# Patient Record
Sex: FEMALE
[De-identification: names, ages, dates, MRNs, and addresses within clinical notes are randomized per-mention and may not be internally consistent; named-entity substitution may affect disease eponyms.]

---

## 2017-11-01 PROBLEM — S62.316A CLOSED DISPLACED FRACTURE OF BASE OF FIFTH METACARPAL BONE OF RIGHT HAND, INITIAL ENCOUNTER: Status: ACTIVE | Noted: 2017-11-01

## 2017-11-03 ENCOUNTER — CHARTING TRANS (OUTPATIENT)
Dept: OTHER | Age: 72
End: 2017-11-03

## 2019-08-20 PROBLEM — M89.9 DISORDER OF BONE, UNSPECIFIED: Status: ACTIVE | Noted: 2019-08-20

## 2019-08-20 PROBLEM — Z12.31 ENCOUNTER FOR SCREENING MAMMOGRAM FOR BREAST CANCER: Status: ACTIVE | Noted: 2019-08-20

## 2019-08-20 PROBLEM — Z51.81 ENCOUNTER FOR MEDICATION MONITORING: Status: ACTIVE | Noted: 2019-08-20

## 2019-08-20 PROBLEM — R63.5 WEIGHT GAIN: Status: ACTIVE | Noted: 2019-08-20

## 2019-08-20 PROBLEM — Z87.891 PERSONAL HISTORY OF NICOTINE DEPENDENCE: Status: ACTIVE | Noted: 2019-08-20

## 2019-10-08 PROBLEM — F17.200 SMOKER: Status: ACTIVE | Noted: 2019-10-08

## 2019-10-08 PROBLEM — I73.9 CLAUDICATION (HCC): Status: ACTIVE | Noted: 2019-10-08

## 2020-12-14 PROBLEM — I73.9 CLAUDICATION (HCC): Status: RESOLVED | Noted: 2019-10-08 | Resolved: 2020-12-14

## 2021-04-30 PROBLEM — Z51.81 ENCOUNTER FOR MEDICATION MONITORING: Status: RESOLVED | Noted: 2019-08-20 | Resolved: 2021-04-30

## 2021-04-30 PROBLEM — R63.5 WEIGHT GAIN: Status: RESOLVED | Noted: 2019-08-20 | Resolved: 2021-04-30

## 2021-04-30 PROBLEM — F17.200 CURRENT SMOKER: Status: ACTIVE | Noted: 2019-10-08

## 2021-11-24 ENCOUNTER — APPOINTMENT (OUTPATIENT)
Dept: GENERAL RADIOLOGY | Facility: HOSPITAL | Age: 76
End: 2021-11-24
Attending: EMERGENCY MEDICINE
Payer: MEDICARE

## 2021-11-24 ENCOUNTER — HOSPITAL ENCOUNTER (EMERGENCY)
Facility: HOSPITAL | Age: 76
Discharge: HOME | End: 2021-11-24
Attending: EMERGENCY MEDICINE
Payer: MEDICARE

## 2021-11-24 VITALS
RESPIRATION RATE: 18 BRPM | TEMPERATURE: 98 F | HEART RATE: 99 BPM | WEIGHT: 169 LBS | DIASTOLIC BLOOD PRESSURE: 76 MMHG | BODY MASS INDEX: 27.82 KG/M2 | HEIGHT: 65.5 IN | SYSTOLIC BLOOD PRESSURE: 133 MMHG | OXYGEN SATURATION: 96 %

## 2021-11-24 DIAGNOSIS — S82.102A CLOSED FRACTURE OF PROXIMAL END OF LEFT TIBIA, UNSPECIFIED FRACTURE MORPHOLOGY, INITIAL ENCOUNTER: Primary | ICD-10-CM

## 2021-11-24 DIAGNOSIS — S62.613A CLOSED DISPLACED FRACTURE OF PROXIMAL PHALANX OF LEFT MIDDLE FINGER, INITIAL ENCOUNTER: ICD-10-CM

## 2021-11-24 PROCEDURE — 26720 TREAT FINGER FRACTURE EACH: CPT

## 2021-11-24 PROCEDURE — 97166 OT EVAL MOD COMPLEX 45 MIN: CPT

## 2021-11-24 PROCEDURE — 97162 PT EVAL MOD COMPLEX 30 MIN: CPT

## 2021-11-24 PROCEDURE — 73502 X-RAY EXAM HIP UNI 2-3 VIEWS: CPT | Performed by: EMERGENCY MEDICINE

## 2021-11-24 PROCEDURE — 97530 THERAPEUTIC ACTIVITIES: CPT

## 2021-11-24 PROCEDURE — 73560 X-RAY EXAM OF KNEE 1 OR 2: CPT | Performed by: EMERGENCY MEDICINE

## 2021-11-24 PROCEDURE — 99285 EMERGENCY DEPT VISIT HI MDM: CPT

## 2021-11-24 PROCEDURE — 73140 X-RAY EXAM OF FINGER(S): CPT | Performed by: EMERGENCY MEDICINE

## 2021-11-24 RX ORDER — IBUPROFEN 600 MG/1
600 TABLET ORAL ONCE
Status: COMPLETED | OUTPATIENT
Start: 2021-11-24 | End: 2021-11-24

## 2021-11-24 RX ORDER — HYDROCODONE BITARTRATE AND ACETAMINOPHEN 5; 325 MG/1; MG/1
1 TABLET ORAL ONCE
Status: COMPLETED | OUTPATIENT
Start: 2021-11-24 | End: 2021-11-24

## 2021-11-24 NOTE — OCCUPATIONAL THERAPY NOTE
OCCUPATIONAL THERAPY EVALUATION - INPATIENT      Room Number: 39HA/39-HA  Evaluation Date: 11/24/2021  Type of Evaluation: Initial       Physician Order: IP Consult to Occupational Therapy  Reason for Therapy: ADL/IADL Dysfunction and Discharge Planning maximize patient function and increase patient participation, safety, and independence with basic ADL and everyday activities.        DISCHARGE RECOMMENDATIONS: RIGOBERTO            PLAN  OT Treatment Plan: Energy conservation/work simplification techniques;Derrick agreeable to all tasks presented.      OCCUPATIONAL THERAPY EXAMINATION     OBJECTIVE  Precautions: Knee immobilizer (KI on until hinge brace arrives-hinge brace locked in extens)       WEIGHT BEARING RESTRICTION           L Lower Extremity: Non-Weight Bear

## 2021-11-24 NOTE — ED PROVIDER NOTES
Patient Seen in: [facility] Emergency Department      History   Patient presents with:  Fall    Stated Complaint: FALL     Subjective:   HPI    81-year-old female presents to the emergency department after a fall.   Patient reports slip and fall we on file             Review of Systems    Positive for stated complaint: FALL   Other systems are as noted in HPI. Constitutional and vital signs reviewed. All other systems reviewed and negative except as noted above.     Physical Exam     ED Triage V oriented to person, place, and time.       Comments: Motor exam limited due to left hip and knee pain, upper extremities 5/5 throughout, bilateral lower extremities distal strength and sensation intact               ED Course     Labs Reviewed   RAPID SARS- verbalized understanding of and agreement with this plan.              Disposition and Plan     Clinical Impression:  Closed fracture of proximal end of left tibia, unspecified fracture morphology, initial encounter  (primary encounter diagnosis)  Closed di

## 2021-11-24 NOTE — ED INITIAL ASSESSMENT (HPI)
PATIENT AOX3 TO ED VIA PRIVATE VEHICLE PATIENT [facility] FALL X TODAY DENIES LOC +LEFT KNEE PAIN RADIATING UPWARDS.  PAIN 6/10 +SWELLING NOTED

## 2021-11-24 NOTE — ED QUICK NOTES
[facility] at bedside to take patient to [facility]. All paperwork accompanying patient out of ed .

## 2021-11-24 NOTE — CM/SW NOTE
Authorization obtained for admission to rehab. The pt will go to room #22 on the [facility].     Nurse to nurse report #[phone]    [facility] ETA: [phone]    PCS completed

## 2021-11-24 NOTE — PHYSICAL THERAPY NOTE
PHYSICAL THERAPY EVALUATION - INPATIENT     Room Number: 39HA/39-HA  Evaluation Date: 11/24/2021  Type of Evaluation: Initial   Physician Order: PT Eval and Treat    Presenting Problem: fall at home resulting in non-displaced prox intra-articular tibial f SBA and support of LLE to maintain balance. Tolerance in sitting limited by pain. Patient supine on transport cart at end of session with needs in reach. Patient functioning lower than baseline levels.  Due to deficits listed above, unsafe to return Past Surgical History  Past Surgical History:   Procedure Laterality Date   • CATARACT EXTRACTION Bilateral    • COLONOSCOPY  04/03/2014   • DEXA, AXIAL SITE (SPINE, HIPS, PELVIS)  08/17, 9/19, 9/9/2021   • EXCISIONAL BIOPSY LEFT Left 1999    benign on back to sitting on the side of the bed?: A Lot   How much help from another person does the patient currently need. ..    Help from Another: Moving to and from a bed to a chair (including a wheelchair)?: A Lot   Help from Another: Need to walk in hospital

## 2021-11-24 NOTE — CM/SW NOTE
The order for knee brace faxed to [facility] in [place]. Referrals for rehab sent through [facility] to 8 facilities.     Pt will need PT/OT eval based on her insurance

## 2022-04-07 PROBLEM — F17.210 CIGARETTE SMOKER: Status: ACTIVE | Noted: 2019-10-08

## 2022-04-07 PROBLEM — F17.200 CURRENT SMOKER: Status: RESOLVED | Noted: 2019-10-08 | Resolved: 2022-04-07

## 2023-08-13 ENCOUNTER — APPOINTMENT (OUTPATIENT)
Dept: MRI IMAGING | Facility: HOSPITAL | Age: 78
End: 2023-08-13
Attending: Other
Payer: MEDICARE

## 2023-08-13 ENCOUNTER — HOSPITAL ENCOUNTER (INPATIENT)
Facility: HOSPITAL | Age: 78
LOS: 4 days | Discharge: NURSING, REHAB OR LONG-TERM CARE | End: 2023-08-17
Attending: EMERGENCY MEDICINE | Admitting: INTERNAL MEDICINE
Payer: MEDICARE

## 2023-08-13 ENCOUNTER — APPOINTMENT (OUTPATIENT)
Dept: CT IMAGING | Facility: HOSPITAL | Age: 78
End: 2023-08-13
Attending: Other
Payer: MEDICARE

## 2023-08-13 ENCOUNTER — APPOINTMENT (OUTPATIENT)
Dept: CT IMAGING | Facility: HOSPITAL | Age: 78
End: 2023-08-13
Attending: EMERGENCY MEDICINE
Payer: MEDICARE

## 2023-08-13 DIAGNOSIS — I63.9 ACUTE CVA (CEREBROVASCULAR ACCIDENT) (HCC): Primary | ICD-10-CM

## 2023-08-13 DIAGNOSIS — R03.0 ELEVATED BP WITHOUT DIAGNOSIS OF HYPERTENSION: ICD-10-CM

## 2023-08-13 PROBLEM — I61.1 NONTRAUMATIC CORTICAL HEMORRHAGE OF RIGHT CEREBRAL HEMISPHERE (HCC): Status: ACTIVE | Noted: 2023-08-13

## 2023-08-13 LAB
ALBUMIN SERPL-MCNC: 4.1 G/DL (ref 3.4–5)
ALP LIVER SERPL-CCNC: 73 U/L
ALT SERPL-CCNC: 20 U/L
AMMONIA PLAS-MCNC: 13 UMOL/L (ref 11–32)
AMPHET UR QL SCN: NEGATIVE
ANION GAP SERPL CALC-SCNC: 8 MMOL/L (ref 0–18)
APTT PPP: 29.9 SECONDS (ref 23.3–35.6)
AST SERPL-CCNC: 17 U/L (ref 15–37)
ATRIAL RATE: 76 BPM
BASOPHILS # BLD AUTO: 0.04 X10(3) UL (ref 0–0.2)
BASOPHILS NFR BLD AUTO: 0.4 %
BENZODIAZ UR QL SCN: NEGATIVE
BILIRUB DIRECT SERPL-MCNC: 0.1 MG/DL (ref 0–0.2)
BILIRUB SERPL-MCNC: 0.4 MG/DL (ref 0.1–2)
BUN BLD-MCNC: 7 MG/DL (ref 7–18)
BUN/CREAT SERPL: 10.3 (ref 10–20)
CALCIUM BLD-MCNC: 8.8 MG/DL (ref 8.5–10.1)
CANNABINOIDS UR QL SCN: NEGATIVE
CHLORIDE SERPL-SCNC: 99 MMOL/L (ref 98–112)
CO2 SERPL-SCNC: 25 MMOL/L (ref 21–32)
COCAINE UR QL: NEGATIVE
CREAT BLD-MCNC: 0.68 MG/DL
CREAT UR-SCNC: 14.2 MG/DL
DEPRECATED RDW RBC AUTO: 41.8 FL (ref 35.1–46.3)
EGFRCR SERPLBLD CKD-EPI 2021: 90 ML/MIN/1.73M2 (ref 60–?)
EOSINOPHIL # BLD AUTO: 0.03 X10(3) UL (ref 0–0.7)
EOSINOPHIL NFR BLD AUTO: 0.3 %
ERYTHROCYTE [DISTWIDTH] IN BLOOD BY AUTOMATED COUNT: 12.8 % (ref 11–15)
GLUCOSE BLD-MCNC: 109 MG/DL (ref 70–99)
GLUCOSE BLDC GLUCOMTR-MCNC: 113 MG/DL (ref 70–99)
GLUCOSE BLDC GLUCOMTR-MCNC: 115 MG/DL (ref 70–99)
GLUCOSE BLDC GLUCOMTR-MCNC: 134 MG/DL (ref 70–99)
HCT VFR BLD AUTO: 40.2 %
HGB BLD-MCNC: 13.8 G/DL
IMM GRANULOCYTES # BLD AUTO: 0.05 X10(3) UL (ref 0–1)
IMM GRANULOCYTES NFR BLD: 0.5 %
INR BLD: 0.96 (ref 0.85–1.16)
LYMPHOCYTES # BLD AUTO: 1.1 X10(3) UL (ref 1–4)
LYMPHOCYTES NFR BLD AUTO: 11.2 %
MCH RBC QN AUTO: 30.4 PG (ref 26–34)
MCHC RBC AUTO-ENTMCNC: 34.3 G/DL (ref 31–37)
MCV RBC AUTO: 88.5 FL
MDMA UR QL SCN: NEGATIVE
MONOCYTES # BLD AUTO: 0.93 X10(3) UL (ref 0.1–1)
MONOCYTES NFR BLD AUTO: 9.5 %
NEUTROPHILS # BLD AUTO: 7.69 X10 (3) UL (ref 1.5–7.7)
NEUTROPHILS # BLD AUTO: 7.69 X10(3) UL (ref 1.5–7.7)
NEUTROPHILS NFR BLD AUTO: 78.1 %
OPIATES UR QL SCN: NEGATIVE
OSMOLALITY SERPL CALC.SUM OF ELEC: 273 MOSM/KG (ref 275–295)
OXYCODONE UR QL SCN: NEGATIVE
P AXIS: 67 DEGREES
P-R INTERVAL: 176 MS
PLATELET # BLD AUTO: 277 10(3)UL (ref 150–450)
POTASSIUM SERPL-SCNC: 4.1 MMOL/L (ref 3.5–5.1)
PROT SERPL-MCNC: 7.3 G/DL (ref 6.4–8.2)
PROTHROMBIN TIME: 12.7 SECONDS (ref 11.6–14.8)
Q-T INTERVAL: 386 MS
QRS DURATION: 78 MS
QTC CALCULATION (BEZET): 434 MS
R AXIS: 8 DEGREES
RBC # BLD AUTO: 4.54 X10(6)UL
SODIUM SERPL-SCNC: 132 MMOL/L (ref 136–145)
T AXIS: 31 DEGREES
VENTRICULAR RATE: 76 BPM
WBC # BLD AUTO: 9.8 X10(3) UL (ref 4–11)

## 2023-08-13 PROCEDURE — 70496 CT ANGIOGRAPHY HEAD: CPT | Performed by: OTHER

## 2023-08-13 PROCEDURE — 99223 1ST HOSP IP/OBS HIGH 75: CPT | Performed by: OTHER

## 2023-08-13 PROCEDURE — 70498 CT ANGIOGRAPHY NECK: CPT | Performed by: OTHER

## 2023-08-13 PROCEDURE — 70450 CT HEAD/BRAIN W/O DYE: CPT | Performed by: EMERGENCY MEDICINE

## 2023-08-13 PROCEDURE — 70551 MRI BRAIN STEM W/O DYE: CPT | Performed by: OTHER

## 2023-08-13 RX ORDER — LABETALOL HYDROCHLORIDE 5 MG/ML
10 INJECTION, SOLUTION INTRAVENOUS EVERY 10 MIN PRN
Status: DISCONTINUED | OUTPATIENT
Start: 2023-08-13 | End: 2023-08-17

## 2023-08-13 RX ORDER — LORAZEPAM 2 MG/ML
2 INJECTION INTRAMUSCULAR
Status: DISCONTINUED | OUTPATIENT
Start: 2023-08-13 | End: 2023-08-16

## 2023-08-13 RX ORDER — ACETAMINOPHEN 500 MG
500 TABLET ORAL EVERY 4 HOURS PRN
Status: DISCONTINUED | OUTPATIENT
Start: 2023-08-13 | End: 2023-08-17

## 2023-08-13 RX ORDER — ATORVASTATIN CALCIUM 40 MG/1
40 TABLET, FILM COATED ORAL DAILY
Status: DISCONTINUED | OUTPATIENT
Start: 2023-08-13 | End: 2023-08-17

## 2023-08-13 RX ORDER — LEVETIRACETAM 500 MG/5ML
500 INJECTION, SOLUTION, CONCENTRATE INTRAVENOUS EVERY 12 HOURS
Status: DISCONTINUED | OUTPATIENT
Start: 2023-08-13 | End: 2023-08-17

## 2023-08-13 RX ORDER — LEVETIRACETAM 500 MG/5ML
500 INJECTION, SOLUTION, CONCENTRATE INTRAVENOUS EVERY 12 HOURS
Status: DISCONTINUED | OUTPATIENT
Start: 2023-08-14 | End: 2023-08-13

## 2023-08-13 RX ORDER — ENEMA 19; 7 G/133ML; G/133ML
1 ENEMA RECTAL ONCE AS NEEDED
Status: DISCONTINUED | OUTPATIENT
Start: 2023-08-13 | End: 2023-08-17

## 2023-08-13 RX ORDER — ACETAMINOPHEN 650 MG/1
650 SUPPOSITORY RECTAL EVERY 4 HOURS PRN
Status: DISCONTINUED | OUTPATIENT
Start: 2023-08-13 | End: 2023-08-17

## 2023-08-13 RX ORDER — METOCLOPRAMIDE HYDROCHLORIDE 5 MG/ML
10 INJECTION INTRAMUSCULAR; INTRAVENOUS EVERY 8 HOURS PRN
Status: DISCONTINUED | OUTPATIENT
Start: 2023-08-13 | End: 2023-08-13

## 2023-08-13 RX ORDER — LORAZEPAM 1 MG/1
1 TABLET ORAL
Status: DISCONTINUED | OUTPATIENT
Start: 2023-08-13 | End: 2023-08-16

## 2023-08-13 RX ORDER — ACETAMINOPHEN 325 MG/1
650 TABLET ORAL EVERY 4 HOURS PRN
Status: DISCONTINUED | OUTPATIENT
Start: 2023-08-13 | End: 2023-08-13

## 2023-08-13 RX ORDER — BISACODYL 10 MG
10 SUPPOSITORY, RECTAL RECTAL
Status: DISCONTINUED | OUTPATIENT
Start: 2023-08-13 | End: 2023-08-17

## 2023-08-13 RX ORDER — SENNOSIDES 8.6 MG
17.2 TABLET ORAL NIGHTLY PRN
Status: DISCONTINUED | OUTPATIENT
Start: 2023-08-13 | End: 2023-08-17

## 2023-08-13 RX ORDER — LORAZEPAM 1 MG/1
2 TABLET ORAL
Status: DISCONTINUED | OUTPATIENT
Start: 2023-08-13 | End: 2023-08-16

## 2023-08-13 RX ORDER — ONDANSETRON 2 MG/ML
4 INJECTION INTRAMUSCULAR; INTRAVENOUS EVERY 6 HOURS PRN
Status: DISCONTINUED | OUTPATIENT
Start: 2023-08-13 | End: 2023-08-17

## 2023-08-13 RX ORDER — METOCLOPRAMIDE HYDROCHLORIDE 5 MG/ML
10 INJECTION INTRAMUSCULAR; INTRAVENOUS EVERY 8 HOURS PRN
Status: DISCONTINUED | OUTPATIENT
Start: 2023-08-13 | End: 2023-08-17

## 2023-08-13 RX ORDER — MINERAL OIL AND PETROLATUM 150; 830 MG/G; MG/G
OINTMENT OPHTHALMIC AS NEEDED
Status: DISCONTINUED | OUTPATIENT
Start: 2023-08-13 | End: 2023-08-17

## 2023-08-13 RX ORDER — MINERAL OIL, PETROLATUM 425; 568 MG/G; MG/G
OINTMENT OPHTHALMIC
Status: DISCONTINUED | OUTPATIENT
Start: 2023-08-13 | End: 2023-08-13 | Stop reason: SDUPTHER

## 2023-08-13 RX ORDER — POLYETHYLENE GLYCOL 3350 17 G/17G
17 POWDER, FOR SOLUTION ORAL DAILY PRN
Status: DISCONTINUED | OUTPATIENT
Start: 2023-08-13 | End: 2023-08-17

## 2023-08-13 RX ORDER — HYDRALAZINE HYDROCHLORIDE 20 MG/ML
10 INJECTION INTRAMUSCULAR; INTRAVENOUS EVERY 2 HOUR PRN
Status: DISCONTINUED | OUTPATIENT
Start: 2023-08-13 | End: 2023-08-17

## 2023-08-13 RX ORDER — SODIUM CHLORIDE 9 MG/ML
INJECTION, SOLUTION INTRAVENOUS CONTINUOUS
Status: DISCONTINUED | OUTPATIENT
Start: 2023-08-13 | End: 2023-08-16

## 2023-08-13 RX ORDER — LORAZEPAM 2 MG/ML
1 INJECTION INTRAMUSCULAR
Status: DISCONTINUED | OUTPATIENT
Start: 2023-08-13 | End: 2023-08-16

## 2023-08-13 RX ORDER — NICOTINE 21 MG/24HR
1 PATCH, TRANSDERMAL 24 HOURS TRANSDERMAL DAILY
Status: DISCONTINUED | OUTPATIENT
Start: 2023-08-13 | End: 2023-08-17

## 2023-08-13 RX ORDER — ONDANSETRON 2 MG/ML
4 INJECTION INTRAMUSCULAR; INTRAVENOUS EVERY 6 HOURS PRN
Status: DISCONTINUED | OUTPATIENT
Start: 2023-08-13 | End: 2023-08-13

## 2023-08-13 RX ORDER — LABETALOL HYDROCHLORIDE 5 MG/ML
20 INJECTION, SOLUTION INTRAVENOUS ONCE
Status: COMPLETED | OUTPATIENT
Start: 2023-08-13 | End: 2023-08-13

## 2023-08-13 RX ORDER — LEVETIRACETAM 500 MG/5ML
1000 INJECTION, SOLUTION, CONCENTRATE INTRAVENOUS ONCE
Status: COMPLETED | OUTPATIENT
Start: 2023-08-13 | End: 2023-08-13

## 2023-08-13 NOTE — PROGRESS NOTES
Received patient from ED to PCCU 216. Report received from [facility]. Medications, labs, plan of care reviewed. Skin assessment on arrival to unit performed with Grace Rodríguez and Andrew LAMAR. Wounds are as follows: none, skin intact. Additional notifications/statements include: daughters at bedside. Patient A&Ox4, restless, impulsive, left hand grasp slightly weaker. Moves all extremities. Nicardipine infusing to keep SBP <140.

## 2023-08-13 NOTE — ED QUICK NOTES
Orders for admission, patient is aware of plan and ready to go upstairs. Any questions, please call ED RN Helen at extension [phone].      Patient Covid vaccination status: Fully vaccinated     COVID Test Ordered in ED: None    COVID Suspicion at Admission: N/A    Running Infusions:    niCARdipine Stopped (08/13/23 1094)    None    Mental Status/LOC at time of transport: AO x3, drowsy    Other pertinent information:   CIWA score: N/A   NIH score:  7

## 2023-08-13 NOTE — ED QUICK NOTES
Patient off floor to CT via cart, accompanied by RN    Patient states she remembers feeling \"disoriented\", she is repeating questions and sometimes tries sitting up on her own.   AO x4

## 2023-08-13 NOTE — ED QUICK NOTES
Patient was getting ready for a lunch with her friends, at 57 today family noticed patient unable to see out of left eye (I.e. couldn't reach for objects, and favoring right eye, trying to rifle through her purse and making noise). Weak left , delayed lifting LLE    Took ibuprofen twice this am for chronic ortho pain, takes low dose ASA daily.

## 2023-08-13 NOTE — ED INITIAL ASSESSMENT (HPI)
Per family patient is more disoriented this am. She is unable to see items in front of her and acting very confused per daughters. Also states she is unable to see on the left side of her. She stated to become altered at 11am today. No weakness noted in any extremity. Patient with difficulty following commands.

## 2023-08-13 NOTE — PLAN OF CARE
Patient A&Ox4, impulsive/ figety, unable to sit still at times. Moves all extremities, PERRLA, left sided visual deficit. Slight weakness noted to left arm. NSR on monitor, Nicardipine titrated to keep SBP <140. Family at bedside and aware of plan of care and test results. Kept Npo for SLP eval. Dysphagia screening deferred  until more awake. Bed alarm in use, fall precautions maintained. Patient refusing nicotine patch at this time. See assessments. Will monitor neuro status Q1hr and assess for changes. Problem: CARDIOVASCULAR - ADULT  Goal: Maintains optimal cardiac output and hemodynamic stability  Description: INTERVENTIONS:  - Monitor vital signs, rhythm, and trends  - Monitor for bleeding, hypotension and signs of decreased cardiac output  - Evaluate effectiveness of vasoactive medications to optimize hemodynamic stability  - Monitor arterial and/or venous puncture sites for bleeding and/or hematoma  - Assess quality of pulses, skin color and temperature  - Assess for signs of decreased coronary artery perfusion - ex. Angina  - Evaluate fluid balance, assess for edema, trend weights  Outcome: Progressing     Problem: NEUROLOGICAL - ADULT  Goal: Achieves stable or improved neurological status  Description: INTERVENTIONS  - Assess for and report changes in neurological status  - Initiate measures to prevent increased intracranial pressure  - Maintain blood pressure and fluid volume within ordered parameters to optimize cerebral perfusion and minimize risk of hemorrhage  - Monitor temperature, glucose, and sodium.  Initiate appropriate interventions as ordered  Outcome: Progressing  Goal: Achieves maximal functionality and self care  Description: INTERVENTIONS  - Monitor swallowing and airway patency with patient fatigue and changes in neurological status  - Encourage and assist patient to increase activity and self care with guidance from PT/OT  - Encourage visually impaired, hearing impaired and aphasic patients to use assistive/communication devices  Outcome: Progressing     Problem: Impaired Swallowing  Goal: Minimize aspiration risk  Description: Interventions:  - Patient should be alert and upright for all feedings (90 degrees preferred)  - Offer food and liquids at a slow rate  - No straws  - Encourage small bites of food and small sips of liquid  - Offer pills one at a time, crush or deliver with applesauce as needed  - Discontinue feeding and notify MD (or speech pathologist) if coughing or persistent throat clearing or wet/gurgly vocal quality is noted  Outcome: Progressing     Problem: Impaired Cognition  Goal: Patient will exhibit improved attention, thought processing and/or memory  Description: Interventions:  - Encourage use of hearing aids  Outcome: Progressing     Problem: Impaired Communication  Goal: Patient will achieve maximal communication potential  Description: Interventions:  - Allow additional time for processing after asking questions or providing instructions  - Encourage use of hearing aids  Outcome: Progressing     Problem: SAFETY ADULT - FALL  Goal: Free from fall injury  Description: INTERVENTIONS:  - Assess pt frequently for physical needs  - Identify cognitive and physical deficits and behaviors that affect risk of falls. - [place] fall precautions as indicated by assessment.  - Educate pt/family on patient safety including physical limitations  - Instruct pt to call for assistance with activity based on assessment  - Modify environment to reduce risk of injury  - Provide assistive devices as appropriate  - Consider OT/PT consult to assist with strengthening/mobility  - Encourage toileting schedule  Outcome: Progressing     Problem: Patient Centered Care  Goal: Patient preferences are identified and integrated in the patient's plan of care  Description: Interventions:  - What would you like us to know as we care for you?  Patient lives at home with daughter  - Provide timely, complete, and accurate information to patient/family  - Incorporate patient and family knowledge, values, beliefs, and cultural backgrounds into the planning and delivery of care  - Encourage patient/family to participate in care and decision-making at the level they choose  - Honor patient and family perspectives and choices  Outcome: Progressing     Problem: Patient/Family Goals  Goal: Patient/Family Long Term Goal  Description: Patient's Long Term Goal: to return home at prior level of function    Interventions:  - stroke protocol  -monitor CT/ MRI  -PT/OT , SLP eval  -frequent neuro checks    - See additional Care Plan goals for specific interventions  Outcome: Progressing  Goal: Patient/Family Short Term Goal  Description: Patient's Short Term Goal: improved mental status  Interventions:   - frequent neuro checks  -monitor CT, MRI  -monitor labs    - See additional Care Plan goals for specific interventions  Outcome: Progressing

## 2023-08-13 NOTE — ED QUICK NOTES
Critical care transport arranged for tx to [facility] per Dr. Grisel Laurent request, eta 15 min.

## 2023-08-13 NOTE — IMAGING NOTE
Neurosurgery    Pt presents with L visual field cut and L paresis. More confused but bright and awake. CT reviewed. L temporal IPH with local mass effect. Likely amyloid. No herniation. No indication for emergent surgery  Admit CCU  Strict SBP <140  Neuro consult\  Repeat CT in AM or sooner if any change in mental status.

## 2023-08-14 ENCOUNTER — APPOINTMENT (OUTPATIENT)
Dept: CT IMAGING | Facility: HOSPITAL | Age: 78
End: 2023-08-14
Attending: Other
Payer: MEDICARE

## 2023-08-14 ENCOUNTER — APPOINTMENT (OUTPATIENT)
Dept: CV DIAGNOSTICS | Facility: HOSPITAL | Age: 78
End: 2023-08-14
Attending: INTERNAL MEDICINE
Payer: MEDICARE

## 2023-08-14 PROBLEM — H53.462 LEFT HOMONYMOUS HEMIANOPSIA: Status: ACTIVE | Noted: 2023-08-14

## 2023-08-14 PROBLEM — G81.94 LEFT HEMIPARESIS (HCC): Status: ACTIVE | Noted: 2023-08-14

## 2023-08-14 LAB
ANION GAP SERPL CALC-SCNC: 5 MMOL/L (ref 0–18)
BASOPHILS # BLD AUTO: 0.03 X10(3) UL (ref 0–0.2)
BASOPHILS NFR BLD AUTO: 0.3 %
BUN BLD-MCNC: 5 MG/DL (ref 7–18)
BUN/CREAT SERPL: 9.6 (ref 10–20)
CALCIUM BLD-MCNC: 8.3 MG/DL (ref 8.5–10.1)
CHLORIDE SERPL-SCNC: 104 MMOL/L (ref 98–112)
CO2 SERPL-SCNC: 27 MMOL/L (ref 21–32)
CREAT BLD-MCNC: 0.52 MG/DL
DEPRECATED RDW RBC AUTO: 43 FL (ref 35.1–46.3)
EGFRCR SERPLBLD CKD-EPI 2021: 96 ML/MIN/1.73M2 (ref 60–?)
EOSINOPHIL # BLD AUTO: 0.01 X10(3) UL (ref 0–0.7)
EOSINOPHIL NFR BLD AUTO: 0.1 %
ERYTHROCYTE [DISTWIDTH] IN BLOOD BY AUTOMATED COUNT: 13 % (ref 11–15)
GLUCOSE BLD-MCNC: 120 MG/DL (ref 70–99)
GLUCOSE BLDC GLUCOMTR-MCNC: 111 MG/DL (ref 70–99)
GLUCOSE BLDC GLUCOMTR-MCNC: 111 MG/DL (ref 70–99)
HCT VFR BLD AUTO: 39.2 %
HGB BLD-MCNC: 13.1 G/DL
IMM GRANULOCYTES # BLD AUTO: 0.05 X10(3) UL (ref 0–1)
IMM GRANULOCYTES NFR BLD: 0.5 %
LYMPHOCYTES # BLD AUTO: 0.84 X10(3) UL (ref 1–4)
LYMPHOCYTES NFR BLD AUTO: 7.8 %
MAGNESIUM SERPL-MCNC: 1.7 MG/DL (ref 1.6–2.6)
MCH RBC QN AUTO: 30.1 PG (ref 26–34)
MCHC RBC AUTO-ENTMCNC: 33.4 G/DL (ref 31–37)
MCV RBC AUTO: 90.1 FL
MONOCYTES # BLD AUTO: 1.17 X10(3) UL (ref 0.1–1)
MONOCYTES NFR BLD AUTO: 10.9 %
MRSA DNA SPEC QL NAA+PROBE: NEGATIVE
NEUTROPHILS # BLD AUTO: 8.62 X10 (3) UL (ref 1.5–7.7)
NEUTROPHILS # BLD AUTO: 8.62 X10(3) UL (ref 1.5–7.7)
NEUTROPHILS NFR BLD AUTO: 80.4 %
OSMOLALITY SERPL CALC.SUM OF ELEC: 280 MOSM/KG (ref 275–295)
PLATELET # BLD AUTO: 269 10(3)UL (ref 150–450)
POTASSIUM SERPL-SCNC: 3.6 MMOL/L (ref 3.5–5.1)
RBC # BLD AUTO: 4.35 X10(6)UL
SARS-COV-2 RNA RESP QL NAA+PROBE: NOT DETECTED
SODIUM SERPL-SCNC: 136 MMOL/L (ref 136–145)
WBC # BLD AUTO: 10.7 X10(3) UL (ref 4–11)

## 2023-08-14 PROCEDURE — 70450 CT HEAD/BRAIN W/O DYE: CPT | Performed by: OTHER

## 2023-08-14 PROCEDURE — 99233 SBSQ HOSP IP/OBS HIGH 50: CPT | Performed by: OTHER

## 2023-08-14 PROCEDURE — 93306 TTE W/DOPPLER COMPLETE: CPT | Performed by: INTERNAL MEDICINE

## 2023-08-14 RX ORDER — MAGNESIUM SULFATE HEPTAHYDRATE 40 MG/ML
2 INJECTION, SOLUTION INTRAVENOUS ONCE
Status: COMPLETED | OUTPATIENT
Start: 2023-08-14 | End: 2023-08-14

## 2023-08-14 NOTE — SLP NOTE
ADULT SWALLOWING EVALUATION    ASSESSMENT    ASSESSMENT/OVERALL IMPRESSION:  PPE REQUIRED. THIS THERAPIST WORE GLOVES, DROPLET MASK, AND GOGGLES FOR DURATION OF EVALUATION. HANDS WASHED UPON ENTRANCE/EXIT. Tony Jean SLP BSSE orders received and acknowledged. A swallow evaluation warranted secondary to stroke protocol. Pt afebrile with clear but weak vocal quality, on 2L/Min, with oxygen saturation at 96. Pt with no prior hx of dysphagia at [address] [place] [address]. Pt positioned upright in bed with daughters at bedside. Oral [facility] exam completed and overall reduced strength, range of motion, and rate of motion observed. Pt with adequate oral acceptance and bilabial seal across all trials. Pt with intact bite, prolonged mastication of solids, and increased OZZIE. Pt's swallow response appears delayed with reduced hyolaryngeal elevation/excursion. No clinical signs of aspiration (e.g., immediate/delayed throat clear, immediate/delayed cough, wet vocal quality, increased O2 effort) observed across all trials of puree and mildly thick liquids. Mild throat clearing observed with soft solids and thin liquids. Trials discontinued. Pt with fluctuating SINDHU throughout evaluation. Oral/buccal cavities clear of residue following all trials. Oxygen status remained >94 t/o the entire evaluation. At this time, pt presents with mild oral dysphagia and probable pharyngeal dysfunction. Recommend a PUREE diet and MILDLY THICK liquids with strict adherence to safe swallowing compensatory strategies. Results and recommendations reviewed with RN and family. SLP collaborated with RN for MD diet orders. FCM SCORE: 3/7     PLAN: SLP to f/u x4 meal assessments, monitor CXR, and VFSS if clinically warranted. RECOMMENDATIONS   Diet Recommendations - Solids: Puree  Diet Recommendations - Liquids: Nectar thick liquids/ Mildly thick      Compensatory Strategies Recommended: No straws; Slow rate; Alternate consistencies;Small bites and sips  Aspiration Precautions: Upright position; Slow rate;Small bites and sips; No straw  Medication Administration Recommendations: Crushed in puree  Treatment Plan/Recommendations: Aspiration precautions  Discharge Recommendations/Plan: Undetermined    HISTORY   MEDICAL HISTORY  Reason for Referral: Stroke protocol    Problem List  Principal Problem:    Acute CVA (cerebrovascular accident) [facility])  Active Problems:    Nontraumatic cortical hemorrhage of right cerebral hemisphere (HCC)    Elevated BP without diagnosis of hypertension    Left hemiparesis ([address]    Left homonymous hemianopsia      Past Medical History  Past Medical History:   Diagnosis Date    Agatston coronary artery calcium score greater than 400     Score = 424 in 9/2019    BRCA1 negative 2014    negative    BRCA2 negative 2005    negative    Chronic vasomotor rhinitis     Closed displaced fracture of base of fifth metacarpal bone of right hand, initial encounter     COPD (chronic obstructive pulmonary disease) ([facility]     Coronary artery disease involving native coronary artery of native heart without angina pectoris     Coronary atherosclerosis     Encounter for hepatitis C screening test for low risk patient 11/2018    negative    Hearing impairment     High cholesterol     History of colon polyps     History of nuclear stress test 08/2017    negative    Mixed hyperlipidemia     Osteoarthritis     Panlobular emphysema ([facility] [address]     By CT 9/2019    Personal history of nicotine dependence     Pulmonary emphysema (HCC)     Senile osteopenia     Sensorineural hearing loss (SNHL) of both ears     Sleep apnea     Visual impairment     Vitamin D deficiency        Prior Living Situation: Home with spouse;Home with support  Diet Prior to Admission: Regular; Thin liquids  Precautions: Aspiration;Hard of hearing    Patient/Family Goals: assess swallow for safest/least restrictive diet    SWALLOWING HISTORY  Current Diet Consistency: NPO  Dysphagia History: None at [address] [place] [address]     Imaging Results:     CT BRAIN 8/14/23:  CONCLUSION:   1. Large 7.8 cm intraparenchymal hematoma in the right temporal lobe overall measures similar in size as compared with previous day head CT. There is, however, a new or more conspicuous 2.4 cm focus of satellite hemorrhage along the posterior-superior   margin of the hematoma. This finding likely relates to expected evolution and/or redistribution of hemorrhage. Mild surrounding vasogenic edema with right cerebral hemispheric mass effect. There is 7 mm right to left midline shift as compared with 5   mm on prior. Continued follow-up is advised. 2. Lesser incidental findings as above.           elm-remote      Dictated by (CST): Janene Marroquin MD on 8/14/2023 at 7:10 AM       Finalized by (CST): Janene Marroquin MD on 8/14/2023 at 7:16 AM       MRI BRAIN 8/13/23:  CONCLUSION: Limited exam due to presence of patient motion. No evidence of acute or subacute infarct. 7 x 4 x 4 cm hematoma is again seen within the right temporal lobe, causing localized mass effect and stable 5 mm leftward midline shift. Dictated by (CST): Ramo Lindsey MD on 8/13/2023 at 6:00 PM       Finalized by (CST): Ramo Lindsey MD on 8/13/2023 at 6:03 PM     OBJECTIVE   ORAL MOTOR EXAMINATION  Dentition: Natural;Functional  Symmetry: Within Functional Limits  Strength:  (reduced)     Range of Motion:  (reduced)  Rate of Motion: Reduced    Voice Quality: Clear;Weak  Respiratory Status: Supplemental O2;Nasal cannula  Consistencies Trialed: Thin liquids; Nectar thick liquids;Puree; Soft solid  Method of Presentation: Staff/Clinician assistance;Spoon;Cup;Single sips  Patient Positioning: Upright;Midline    Oral Phase of Swallow: Impaired  Bolus Retrieval: Intact  Bilabial Seal: Intact  Bolus Formation: Impaired  Bolus Propulsion: Impaired  Mastication: Impaired       Pharyngeal Phase of Swallow: Impaired  Laryngeal Elevation Timing: Appears impaired  Laryngeal Elevation Strength: Appears impaired     (Please note: Silent aspiration cannot be evaluated clinically. Videofluoroscopic Swallow Study is required to rule-out silent aspiration.)    Esophageal Phase of Swallow: No complaints consistent with possible esophageal involvement      GOALS  Goal #1 The patient will tolerate puree consistency and mildly thick liquids without overt signs or symptoms of aspiration with 100 % accuracy over 1-2 session(s). In Progress   Goal #2 The patient/family/caregiver will demonstrate understanding and implementation of aspiration precautions and swallow strategies independently over 4 session(s). In Progress   Goal #3 The patient will tolerate trial upgrade of soft solids/hard solids consistency and thin liquids without overt signs or symptoms of aspiration with 100 % accuracy over 3-4 session(s). In Progress   Goal #4 The patient will utilize compensatory strategies as outlined by  BSSE (clinical evaluation) including Slow rate, Small bites, Small sips, Alternate liquids/solids, No straws, Upright 90 degrees, Feed patient with 1:1 feed assistance 100 % of the time across 2 sessions. In Progress     FOLLOW UP  Treatment Plan/Recommendations: Aspiration precautions  Number of Visits to Meet Established Goals: 4  Follow Up Needed (Documentation Required): Yes  SLP Follow-up Date: 08/15/23    Thank you for your referral.   If you have any questions, please contact Ranjana Vargas, MOMO De [place]  Speech Language Pathologist  Phone Number Ext. [phone]

## 2023-08-14 NOTE — CM/SW NOTE
Received MDO for home health evaluation. PT/OT recommendations pending. Spoke w/ patient's daughter Reva Chacon for assessment. Patient lives with her daughter Tamela Weber in a one level home w/ 1 [address]. Patient is independent with ADLs/IADLs & drives. She does not use any DME. She had home health services following TKA 18 months ago. Discussed discharge plan, family hopes patient can return home once stable but open to discharge recommendations. SW/CM to f/u.     Yasmin Colin, [address]

## 2023-08-14 NOTE — PHYSICAL THERAPY NOTE
PT order received, chart review completed. Per order: \"Eval and treat; if on bedrest start after 24 hours\"     Bed rest orders and activity orders present in pts chart, reaching out to Dr. Mitali Juarez for clarification on when pt is appropriate to start therapy and she requested hold on therapy evaluations until tomorrow 8/15. Will plan to follow up then.

## 2023-08-14 NOTE — OCCUPATIONAL THERAPY NOTE
Orders received, chart reviewed for occupational therapy evaluation. Pt with orders for OT from neuro \"Eval and treat; if on bedrest start after 24 hours\". Pt listed with bed rest and activity orders --clarified with Dr Luh Kendall of neurology via secure chat who requested therapy hold evaluations until 8/15.

## 2023-08-14 NOTE — PLAN OF CARE
Patient A&O x4 but drowsy. Follows commands. Can't see out of left eye, patient will deny vision  field cut. Impulsive and restless. Ativan x1. Daughter Rand Bussing at bedside. Neuro checks q1hr, unchanged. CT head done this AM, results pending. Cardene gtt in place. BP within goal of SBP <140. All safety measures in place. Care endorsed. Problem: NEUROLOGICAL - ADULT  Goal: Achieves stable or improved neurological status  Description: INTERVENTIONS  - Assess for and report changes in neurological status  - Initiate measures to prevent increased intracranial pressure  - Maintain blood pressure and fluid volume within ordered parameters to optimize cerebral perfusion and minimize risk of hemorrhage  - Monitor temperature, glucose, and sodium.  Initiate appropriate interventions as ordered  Outcome: Progressing  Goal: Achieves maximal functionality and self care  Description: INTERVENTIONS  - Monitor swallowing and airway patency with patient fatigue and changes in neurological status  - Encourage and assist patient to increase activity and self care with guidance from PT/OT  - Encourage visually impaired, hearing impaired and aphasic patients to use assistive/communication devices  Outcome: Progressing     Problem: Impaired Swallowing  Goal: Minimize aspiration risk  Description: Interventions:  - Patient should be alert and upright for all feedings (90 degrees preferred)  - Offer food and liquids at a slow rate  - No straws  - Encourage small bites of food and small sips of liquid  - Offer pills one at a time, crush or deliver with applesauce as needed  - Discontinue feeding and notify MD (or speech pathologist) if coughing or persistent throat clearing or wet/gurgly vocal quality is noted  Outcome: Progressing

## 2023-08-14 NOTE — PLAN OF CARE
Problem: CARDIOVASCULAR - ADULT  Goal: Maintains optimal cardiac output and hemodynamic stability  Description: INTERVENTIONS:  - Monitor vital signs, rhythm, and trends  - Monitor for bleeding, hypotension and signs of decreased cardiac output  - Evaluate effectiveness of vasoactive medications to optimize hemodynamic stability  - Monitor arterial and/or venous puncture sites for bleeding and/or hematoma  - Assess quality of pulses, skin color and temperature  - Assess for signs of decreased coronary artery perfusion - ex. Angina  - Evaluate fluid balance, assess for edema, trend weights  Outcome: Progressing     Problem: NEUROLOGICAL - ADULT  Goal: Achieves stable or improved neurological status  Description: INTERVENTIONS  - Assess for and report changes in neurological status  - Initiate measures to prevent increased intracranial pressure  - Maintain blood pressure and fluid volume within ordered parameters to optimize cerebral perfusion and minimize risk of hemorrhage  - Monitor temperature, glucose, and sodium.  Initiate appropriate interventions as ordered  Outcome: Progressing     Problem: Impaired Swallowing  Goal: Minimize aspiration risk  Description: Interventions:  - Patient should be alert and upright for all feedings (90 degrees preferred)  - Offer food and liquids at a slow rate  - No straws  - Encourage small bites of food and small sips of liquid  - Offer pills one at a time, crush or deliver with applesauce as needed  - Discontinue feeding and notify MD (or speech pathologist) if coughing or persistent throat clearing or wet/gurgly vocal quality is noted  Outcome: Progressing     Problem: Impaired Cognition  Goal: Patient will exhibit improved attention, thought processing and/or memory  Description: Interventions:  - Minimize distractions in the room when full attention is required  - Consider use of a daily journal to improve memory and reduce confusion related to daily events and orientation  - Allow additional time for processing after asking questions or providing instructions  - Provide stimulation to the neglected side by encouraging family to sit/stand on the inattentive side during conversation  - For patients with neglect, place hot drinks on the unaffected side  - Encourage use of hearing aids  Outcome: Progressing     Problem: Impaired Communication  Goal: Patient will achieve maximal communication potential  Description: Interventions:  - Encourage use of alternative/augmentative communication to express basic wants and needs (use of communication/letter board/or smartphone/tablet/handwriting)  - Provide modeling for options to improve word retrieval in known context  - Allow additional time for processing after asking questions or providing instructions  - Ask \"yes/no\" questions to facilitate patient's ability to communicate wants and needs  - Encourage use of hearing aids  Outcome: Progressing     Problem: SAFETY ADULT - FALL  Goal: Free from fall injury  Description: INTERVENTIONS:  - Assess pt frequently for physical needs  - Identify cognitive and physical deficits and behaviors that affect risk of falls. - [place] fall precautions as indicated by assessment.  - Educate pt/family on patient safety including physical limitations  - Instruct pt to call for assistance with activity based on assessment  - Modify environment to reduce risk of injury  - Provide assistive devices as appropriate  - Consider OT/PT consult to assist with strengthening/mobility  - Encourage toileting schedule  Outcome: Progressing     Problem: Patient Centered Care  Goal: Patient preferences are identified and integrated in the patient's plan of care  Description: Interventions:  - What would you like us to know as we care for you?  Patient lives at home with daughter  - Provide timely, complete, and accurate information to patient/family  - Incorporate patient and family knowledge, values, beliefs, and cultural backgrounds into the planning and delivery of care  - Encourage patient/family to participate in care and decision-making at the level they choose  - Honor patient and family perspectives and choices  Outcome: Progressing

## 2023-08-15 LAB
ANION GAP SERPL CALC-SCNC: 8 MMOL/L (ref 0–18)
BASOPHILS # BLD AUTO: 0.03 X10(3) UL (ref 0–0.2)
BASOPHILS NFR BLD AUTO: 0.3 %
BUN BLD-MCNC: 7 MG/DL (ref 7–18)
BUN/CREAT SERPL: 13.7 (ref 10–20)
CALCIUM BLD-MCNC: 8 MG/DL (ref 8.5–10.1)
CHLORIDE SERPL-SCNC: 104 MMOL/L (ref 98–112)
CO2 SERPL-SCNC: 24 MMOL/L (ref 21–32)
CREAT BLD-MCNC: 0.51 MG/DL
DEPRECATED RDW RBC AUTO: 41.4 FL (ref 35.1–46.3)
EGFRCR SERPLBLD CKD-EPI 2021: 97 ML/MIN/1.73M2 (ref 60–?)
EOSINOPHIL # BLD AUTO: 0.01 X10(3) UL (ref 0–0.7)
EOSINOPHIL NFR BLD AUTO: 0.1 %
ERYTHROCYTE [DISTWIDTH] IN BLOOD BY AUTOMATED COUNT: 13 % (ref 11–15)
GLUCOSE BLD-MCNC: 94 MG/DL (ref 70–99)
GLUCOSE BLDC GLUCOMTR-MCNC: 113 MG/DL (ref 70–99)
GLUCOSE BLDC GLUCOMTR-MCNC: 98 MG/DL (ref 70–99)
GLUCOSE BLDC GLUCOMTR-MCNC: 99 MG/DL (ref 70–99)
HCT VFR BLD AUTO: 40.3 %
HGB BLD-MCNC: 14 G/DL
IMM GRANULOCYTES # BLD AUTO: 0.04 X10(3) UL (ref 0–1)
IMM GRANULOCYTES NFR BLD: 0.4 %
LYMPHOCYTES # BLD AUTO: 1.14 X10(3) UL (ref 1–4)
LYMPHOCYTES NFR BLD AUTO: 10.3 %
MAGNESIUM SERPL-MCNC: 2.2 MG/DL (ref 1.6–2.6)
MCH RBC QN AUTO: 30.5 PG (ref 26–34)
MCHC RBC AUTO-ENTMCNC: 34.7 G/DL (ref 31–37)
MCV RBC AUTO: 87.8 FL
MONOCYTES # BLD AUTO: 1.4 X10(3) UL (ref 0.1–1)
MONOCYTES NFR BLD AUTO: 12.7 %
NEUTROPHILS # BLD AUTO: 8.42 X10 (3) UL (ref 1.5–7.7)
NEUTROPHILS # BLD AUTO: 8.42 X10(3) UL (ref 1.5–7.7)
NEUTROPHILS NFR BLD AUTO: 76.2 %
OSMOLALITY SERPL CALC.SUM OF ELEC: 280 MOSM/KG (ref 275–295)
PLATELET # BLD AUTO: 243 10(3)UL (ref 150–450)
POTASSIUM SERPL-SCNC: 3.2 MMOL/L (ref 3.5–5.1)
POTASSIUM SERPL-SCNC: 3.2 MMOL/L (ref 3.5–5.1)
RBC # BLD AUTO: 4.59 X10(6)UL
SODIUM SERPL-SCNC: 136 MMOL/L (ref 136–145)
WBC # BLD AUTO: 11 X10(3) UL (ref 4–11)

## 2023-08-15 PROCEDURE — 99233 SBSQ HOSP IP/OBS HIGH 50: CPT | Performed by: OTHER

## 2023-08-15 RX ORDER — AMLODIPINE BESYLATE 5 MG/1
5 TABLET ORAL DAILY
Status: DISCONTINUED | OUTPATIENT
Start: 2023-08-15 | End: 2023-08-17

## 2023-08-15 NOTE — CM/SW NOTE
Department  notified of request for Acute Rehab, aidin referrals started. Assigned CM/SW to follow up with pt/family on further discharge planning.      Hank Clark   August 15, 2023   15:37

## 2023-08-15 NOTE — PHYSICAL THERAPY NOTE
PHYSICAL THERAPY EVALUATION - INPATIENT     Room Number: 232/232-A  Evaluation Date: 8/15/2023  Type of Evaluation: Initial   Physician Order: PT Eval and Treat    Presenting Problem: acute CVA  Co-Morbidities : CAD, COPD, HLD, emphysema, osteopenia, s/p TKA 18 months ago  Reason for Therapy: Mobility Dysfunction and Discharge Planning    PHYSICAL THERAPY ASSESSMENT     Patient is a 69year old female admitted 8/13/2023 for acute CVA. Patient received supine in bed, agreeable to physical therapy evaluation, session coordinated with OT to maximize patient safety and function given patient's medical acuity. Vital signs monitored as noted below, no adverse symptoms and patient stable during session. Pt lethargic throughout requiring increased verbal repetition to get attention, improving with transition to upright. Pt presents with L sided strength, coordination, and visual deficits as well as L sided inattention. Pt able to attend to L field of vision with increased verbal and tactile cues. Pt demonstrates impaired motor planning, impulsivity, and decreased safety awareness. Upon standing patient, grabbing only R side of walker with BUEs, impaired understanding despite cueing of safe walker management. MOBILITY:  ROLLING: moderate assist   SUPINE TO SIT: moderate assist   SIT TO STAND: minimal assist - from bedside chair  STAND TO SIT: minimal assist   BED TO/FROM CHAIR: dependent - modAx2 stand-pivot transfer  GAIT: not tested - deferred 2/2 impaired standing balance, fatigue    Patient is currently functioning below baseline with bed mobility, transfers, gait, stair negotiation, and performing household tasks (ADLs, housework, driving) as a result of the following impairments: decreased endurance/aerobic capacity, impaired standing static and dynamic balance, impaired coordination, impaired motor planning, cognitive deficits, and L sided visual deficits/inattention .  Next session anticipate to progress transfers and gait. Patient history and/or personal factors that may impact the plan of care include cognitive deficits, co-morbidities (CAD, COPD, emphysema, osteopenia) affecting activity tolerance, endurance, medical status, and prior surgeries (R TKA 18 months ago, went to rehab). Based on the physical therapy examination of the noted systems and functional activity/participation limitations, the patient presentation is evolving given the patient demonstrates worsening of previously stable condition, demonstrates worsening of co-morbidities, and demonstrates impulsivity/decreased safety awareness. Patient would benefit from continued skilled physical therapy interventions to maximize patient safety and functional independence. Updated nurse on results of session, patient left seated in bedside chair with chair alarm engaged and daughter present, all lines intact, all needs met with call light in reach. The patient's Approx Degree of Impairment: 81.38% has been calculated based on documentation in the [facility] '6 clicks' Inpatient Basic Mobility Short Form. Research supports that patients with this level of impairment may benefit from long-term care, however based on patient's diagnosis, independent PLOF, and ability to tolerate intensive rehab, recommend D/C to acute inpatient rehabilitation. Patient will benefit from continued IP PT services to address these deficits in preparation for discharge. DISCHARGE RECOMMENDATIONS  PT Discharge Recommendations: Acute rehabilitation    PLAN  PT Treatment Plan: Bed mobility; Coordination; Body mechanics; Endurance; Energy conservation;Patient education; Family education;Gait training;Neuromuscular re-educate;Strengthening;Stair training;Transfer training;Balance training  Rehab Potential : Good  Frequency (Obs): 5x/week       PHYSICAL THERAPY MEDICAL/SOCIAL HISTORY       Problem List  Principal Problem:    Acute CVA (cerebrovascular accident) ([facility] [address]  Active Problems:    Nontraumatic cortical hemorrhage of right cerebral hemisphere (HCC)    Elevated BP without diagnosis of hypertension    Left hemiparesis (HCC)    Left homonymous hemianopsia      HOME SITUATION  Home Situation  Type of Home: House  Home Layout: One level  Stairs to Enter : 0  Lives With: Daughter  Drives: Yes  Patient Owned Equipment: Rolling walker; Wheelchair  Patient Regularly Uses: Hearing aides     Prior Level of [place]: independent no assistive device, drives    SUBJECTIVE  Pt lethargic but cooperative throughout. PHYSICAL THERAPY EXAMINATION     OBJECTIVE  Precautions: Cardiac;Aspiration;Bed/chair alarm; Other (Comment) (CIWA, SBP <150)  Fall Risk: High fall risk    WEIGHT BEARING RESTRICTION  None    PAIN ASSESSMENT  Rating: Unable to rate  Location: headache, R shoulder       COGNITION  Overall Cognitive Status:  Impaired  Attention Span:  difficulty attending to directions and difficulty dividing attention  Initiation: cues to initiate tasks and hand over hand to initiate tasks  Motor Planning: impaired  Safety Judgement:  decreased awareness of need for assistance and decreased awareness of need for safety    RANGE OF MOTION AND STRENGTH ASSESSMENT  Upper extremity ROM WFL BUEs, RUE not MMT'ed 2/2 shoulder pain (recently received cortisone injection)  Lower extremity ROM is within functional limits  BLEs  Lower extremity strength is within functional limits  RLE, LLE, 4-/5 throughout    BALANCE  Static Sitting: Fair +  Dynamic Sitting: Fair  Static Standing: Fair -  Dynamic Standing: Poor +    ADDITIONAL TESTS                       Other Test(s): L sided inattention, L vision impaired; PASS: 17/36            NEUROLOGICAL FINDINGS     Coordination - Heel to [place]: Left decreased speed;Left decreased accuracy  Coordination - Rapid Alternating Movement: Left decreased accuracy; Left decreased speed  Sensation: WNL          ACTIVITY TOLERANCE  Pulse: 89  Heart Rate Source: Monitor     BP: 134/56 (semifowlers pre, 134/69 sitting post)  BP Location: Right arm  BP Method: Automatic  Patient Position: Lying    O2 WALK  Oxygen Therapy  SPO2% on Room Air at Rest: 93  SPO2% Ambulation on Room Air: 92    AM-PAC '6-Clicks' INPATIENT SHORT FORM - BASIC MOBILITY  How much difficulty does the patient currently have. .. Patient Difficulty: Turning over in bed (including adjusting bedclothes, sheets and blankets)?: A Lot   Patient Difficulty: Sitting down on and standing up from a chair with arms (e.g., wheelchair, bedside commode, etc.): A Lot   Patient Difficulty: Moving from lying on back to sitting on the side of the bed?: A Lot   How much help from another person does the patient currently need. .. Help from Another: Moving to and from a bed to a chair (including a wheelchair)?: Total   Help from Another: Need to walk in hospital room?: Total   Help from Another: Climbing 3-5 steps with a railing?: Total     AM-PAC Score:  Raw Score: 9   Approx Degree of Impairment: 81.38%   Standardized Score (AM-PAC Scale): 30.55   CMS Modifier (G-Code): CM    FUNCTIONAL ABILITY STATUS  Functional Mobility/Gait Assessment  Gait Assistance: Not tested      Exercise/Education Provided:  Bed mobility  Body mechanics  Energy conservation  Functional activity tolerated  Neuromuscular re-educate  Posture  Strengthening  Transfer training    Patient End of Session: Up in chair;Needs met;Call light within reach;RN aware of session/findings; Family present; Alarm set    CURRENT GOALS    Goals to be met by: 8/30/23  Patient Goal Patient's self-stated goal is: maximize functional independence and safety   Goal #1 Patient is able to demonstrate supine - sit EOB @ level: supervision     Goal #1   Current Status    Goal #2 Patient is able to demonstrate transfers EOB to/from [facility] at assistance level: contact guard assistance with  least restrictive assistive device     Goal #2  Current Status    Goal #3 Patient is able to ambulate 50 feet with assist device:  least restrictive assistive device  at assistance level: minimum assistance   Goal #3   Current Status    Goal #4 Patient will perform sit to stand transfer from bedside chair with SBA using least restrictive assistive device   Goal #4   Current Status    Goal #5 Patient to demonstrate independence with home activity/exercise instructions provided to patient in preparation for discharge.    Goal #5   Current Status    Goal #6    Goal #6  Current Status      Patient Evaluation Complexity Level:  History Moderate - 1 or 2 personal factors and/or co-morbidities   Examination of body systems Moderate - addressing a total of 3 or more elements   Clinical Presentation Moderate - Evolving   Clinical Decision Making Moderate Complexity       Therapeutic Activity: 20 minutes  Neuromuscular Re-education: 9 minutes      Anil Guzmán, PT, DPT  [facility]  Inpatient Rehabilitation  Physical Therapy  [phone]

## 2023-08-15 NOTE — PLAN OF CARE
Pt safety maintained. Pt is lethargic, follows verbal command when woken up. Pt remained on cardene drip for bp management. IVL replaced. Purewick on. Incontinent care done. Potassium replaced. Bed alarm on. Will continue to monitor. Problem: CARDIOVASCULAR - ADULT  Goal: Maintains optimal cardiac output and hemodynamic stability  Description: INTERVENTIONS:  - Monitor vital signs, rhythm, and trends  - Monitor for bleeding, hypotension and signs of decreased cardiac output  - Evaluate effectiveness of vasoactive medications to optimize hemodynamic stability  - Monitor arterial and/or venous puncture sites for bleeding and/or hematoma  - Assess quality of pulses, skin color and temperature  - Assess for signs of decreased coronary artery perfusion - ex. Angina  - Evaluate fluid balance, assess for edema, trend weights  Outcome: Progressing     Problem: SAFETY ADULT - FALL  Goal: Free from fall injury  Description: INTERVENTIONS:  - Assess pt frequently for physical needs  - Identify cognitive and physical deficits and behaviors that affect risk of falls. - [place] fall precautions as indicated by assessment.  - Educate pt/family on patient safety including physical limitations  - Instruct pt to call for assistance with activity based on assessment  - Modify environment to reduce risk of injury  - Provide assistive devices as appropriate  - Consider OT/PT consult to assist with strengthening/mobility  - Encourage toileting schedule  Outcome: Progressing     Problem: Patient Centered Care  Goal: Patient preferences are identified and integrated in the patient's plan of care  Description: Interventions:  - What would you like us to know as we care for you?  Patient lives at home with daughter  - Provide timely, complete, and accurate information to patient/family  - Incorporate patient and family knowledge, values, beliefs, and cultural backgrounds into the planning and delivery of care  - Encourage patient/family to participate in care and decision-making at the level they choose  - Honor patient and family perspectives and choices  Outcome: Progressing     Problem: NEUROLOGICAL - ADULT  Goal: Achieves stable or improved neurological status  Description: INTERVENTIONS  - Assess for and report changes in neurological status  - Initiate measures to prevent increased intracranial pressure  - Maintain blood pressure and fluid volume within ordered parameters to optimize cerebral perfusion and minimize risk of hemorrhage  - Monitor temperature, glucose, and sodium.  Initiate appropriate interventions as ordered  Outcome: Not Progressing  Goal: Achieves maximal functionality and self care  Description: INTERVENTIONS  - Monitor swallowing and airway patency with patient fatigue and changes in neurological status  - Encourage and assist patient to increase activity and self care with guidance from PT/OT  - Encourage visually impaired, hearing impaired and aphasic patients to use assistive/communication devices  Outcome: Not Progressing     Problem: Impaired Swallowing  Goal: Minimize aspiration risk  Description: Interventions:  - Patient should be alert and upright for all feedings (90 degrees preferred)  - Offer food and liquids at a slow rate  - No straws  - Encourage small bites of food and small sips of liquid  - Offer pills one at a time, crush or deliver with applesauce as needed  - Discontinue feeding and notify MD (or speech pathologist) if coughing or persistent throat clearing or wet/gurgly vocal quality is noted  Outcome: Not Progressing     Problem: Impaired Cognition  Goal: Patient will exhibit improved attention, thought processing and/or memory  Description: Interventions:  - Encourage use of eye glasses  Outcome: Not Progressing     Problem: Impaired Communication  Goal: Patient will achieve maximal communication potential  Description: Interventions:  - Encourage use of eye glasses  Outcome: Not Progressing Problem: Patient/Family Goals  Goal: Patient/Family Long Term Goal  Description: Patient's Long Term Goal: to return home at prior level of function    Interventions:  - stroke protocol  -monitor CT/ MRI  -PT/OT , SLP eval  -frequent neuro checks    - See additional Care Plan goals for specific interventions  Outcome: Not Progressing  Goal: Patient/Family Short Term Goal  Description: Patient's Short Term Goal: improved mental status  Interventions:   - frequent neuro checks  -monitor CT, MRI  -monitor labs    - See additional Care Plan goals for specific interventions  Outcome: Not Progressing

## 2023-08-15 NOTE — SLP NOTE
SPEECH DAILY NOTE - INPATIENT    ASSESSMENT & PLAN   ASSESSMENT  PPE REQUIRED. THIS THERAPIST WORE GLOVES, DROPLET MASK, AND GOGGLES FOR DURATION OF EVALUATION. HANDS WASHED UPON ENTRANCE/EXIT. SLP f/u for ongoing dysphagia tx/meal assessment per recommendations of puree/mildly thick liquids per BSE. RN reports pt tolerates diet and medication well with no overt clinical s/s aspiration. Pt denies any swallowing challenges but remains intermittently confused with fluctuating SINDHU. Pt positioned upright in bedside chair with daughter at bedside. Pt afebrile, tolerating 2L/Min O2NC with oxygen status 95 prior to the start of oral trials. Productive cough at baseline, pt encouraged to expectorate phlegm from oral cavity. SLP reviewed aspiration precautions and safe swallowing compensatory strategies with the patient. Safe swallow guidelines remain written on the white board in purple. Diet recommendations remain on the whiteboard in the room. Patient and family v/u, but pt would benefit from continued education. Provided moderate assistance, pt tolerates puree and mildly thick liquids via open cup with no overt clinical signs/symptoms of aspiration. Pt trialed with soft solids and no overt signs/symptoms of aspiration observed. Pt trialed with thin liquids and immediate wet coughing/throat clearing observed. Trials discontinued. Oxygen status remained >92 t/o the entire session. Respiratory Rate WFL. Recommend upgrade to minced and moist solids and continued mildly thick liquids. PLAN: SLP to f/u x3 meal assessments, monitor imaging, and VFSS if clinically warranted. Diet Recommendations - Solids: Mechanical soft ground/ Minced & Moist  Diet Recommendations - Liquids: Nectar thick liquids/ Mildly thick    Compensatory Strategies Recommended: No straws; Slow rate; Alternate consistencies;Small bites and sips  Aspiration Precautions: Upright position; Slow rate;Small bites and sips; No straw  Medication Administration Recommendations: Crushed in puree    Patient Experiencing Pain: Yes     Pain Location: shoulder     Nursing Aware of Pain?: Yes    Discharge Recommendations  Discharge Recommendations/Plan: Undetermined    Treatment Plan  Treatment Plan/Recommendations: Aspiration precautions    Interdisciplinary Communication: Discussed with RN  Recommendations posted at bedside            GOALS  Goal #1 The patient will tolerate puree consistency and mildly thick liquids without overt signs or symptoms of aspiration with 100 % accuracy over 1-2 session(s). Pt tolerates puree/mildly thick liquids with no overt signs/symptoms of aspiration. Pt upgraded to minced and moist solids. The patient will tolerate minced and moist consistency and mildly thick liquids without overt signs or symptoms of aspiration with 100 % accuracy over 1-2 session(s). Partially met   Goal #2 The patient/family/caregiver will demonstrate understanding and implementation of aspiration precautions and swallow strategies independently over 4 session(s). Education provided regarding aspiration precautions and safe swallow strategies. Pt and daughter v/u to all recommendations - pt would benefit from continued education   In Progress   Goal #3 The patient will tolerate trial upgrade of soft solids/hard solids consistency and thin liquids without overt signs or symptoms of aspiration with 100 % accuracy over 3-4 session(s). Pt trialed with soft solids and no CSA observed. Upgraded to minced and moist. Not appropriate for further advanced solids due to fluctuating SINDHU. Thin liquids trialed and wet coughing/tc observed. Trials discontinued. Partially met   Goal #4 The patient will utilize compensatory strategies as outlined by  BSSE (clinical evaluation) including Slow rate, Small bites, Small sips, Alternate liquids/solids, No straws, Upright 90 degrees, Feed patient with 1:1 feed assistance 100 % of the time across 2 sessions.   Mod assist provided for slow/small bites/sips while upright in bedside chair. Liquids/solids alternated. No straws utilized. Continue. In Progress     FOLLOW UP  Follow Up Needed (Documentation Required): Yes  SLP Follow-up Date: 08/16/23  Number of Visits to Meet Established Goals: 3    Session: 1 following BSE    If you have any questions, please contact Lev Keene, MOMO Klein  Speech Language Pathologist  Phone Number Ext. [phone]

## 2023-08-16 LAB
ANION GAP SERPL CALC-SCNC: 7 MMOL/L (ref 0–18)
BASOPHILS # BLD AUTO: 0.03 X10(3) UL (ref 0–0.2)
BASOPHILS NFR BLD AUTO: 0.4 %
BUN BLD-MCNC: 15 MG/DL (ref 7–18)
BUN/CREAT SERPL: 28.3 (ref 10–20)
CALCIUM BLD-MCNC: 8.1 MG/DL (ref 8.5–10.1)
CHLORIDE SERPL-SCNC: 107 MMOL/L (ref 98–112)
CO2 SERPL-SCNC: 23 MMOL/L (ref 21–32)
CREAT BLD-MCNC: 0.53 MG/DL
DEPRECATED RDW RBC AUTO: 42.1 FL (ref 35.1–46.3)
EGFRCR SERPLBLD CKD-EPI 2021: 96 ML/MIN/1.73M2 (ref 60–?)
EOSINOPHIL # BLD AUTO: 0.02 X10(3) UL (ref 0–0.7)
EOSINOPHIL NFR BLD AUTO: 0.2 %
ERYTHROCYTE [DISTWIDTH] IN BLOOD BY AUTOMATED COUNT: 12.9 % (ref 11–15)
GLUCOSE BLD-MCNC: 83 MG/DL (ref 70–99)
GLUCOSE BLDC GLUCOMTR-MCNC: 111 MG/DL (ref 70–99)
GLUCOSE BLDC GLUCOMTR-MCNC: 120 MG/DL (ref 70–99)
GLUCOSE BLDC GLUCOMTR-MCNC: 88 MG/DL (ref 70–99)
GLUCOSE BLDC GLUCOMTR-MCNC: 91 MG/DL (ref 70–99)
GLUCOSE BLDC GLUCOMTR-MCNC: 96 MG/DL (ref 70–99)
HCT VFR BLD AUTO: 37.1 %
HGB BLD-MCNC: 12.7 G/DL
IMM GRANULOCYTES # BLD AUTO: 0.03 X10(3) UL (ref 0–1)
IMM GRANULOCYTES NFR BLD: 0.4 %
LYMPHOCYTES # BLD AUTO: 1.26 X10(3) UL (ref 1–4)
LYMPHOCYTES NFR BLD AUTO: 14.8 %
MAGNESIUM SERPL-MCNC: 2.2 MG/DL (ref 1.6–2.6)
MCH RBC QN AUTO: 30.5 PG (ref 26–34)
MCHC RBC AUTO-ENTMCNC: 34.2 G/DL (ref 31–37)
MCV RBC AUTO: 89.2 FL
MONOCYTES # BLD AUTO: 1.12 X10(3) UL (ref 0.1–1)
MONOCYTES NFR BLD AUTO: 13.2 %
NEUTROPHILS # BLD AUTO: 6.05 X10 (3) UL (ref 1.5–7.7)
NEUTROPHILS # BLD AUTO: 6.05 X10(3) UL (ref 1.5–7.7)
NEUTROPHILS NFR BLD AUTO: 71 %
OSMOLALITY SERPL CALC.SUM OF ELEC: 284 MOSM/KG (ref 275–295)
PLATELET # BLD AUTO: 263 10(3)UL (ref 150–450)
POTASSIUM SERPL-SCNC: 3.2 MMOL/L (ref 3.5–5.1)
POTASSIUM SERPL-SCNC: 3.2 MMOL/L (ref 3.5–5.1)
POTASSIUM SERPL-SCNC: 4 MMOL/L (ref 3.5–5.1)
RBC # BLD AUTO: 4.16 X10(6)UL
SODIUM SERPL-SCNC: 137 MMOL/L (ref 136–145)
WBC # BLD AUTO: 8.5 X10(3) UL (ref 4–11)

## 2023-08-16 RX ORDER — POTASSIUM CHLORIDE 20 MEQ/1
40 TABLET, EXTENDED RELEASE ORAL EVERY 4 HOURS
Status: COMPLETED | OUTPATIENT
Start: 2023-08-16 | End: 2023-08-16

## 2023-08-16 RX ORDER — TRAMADOL HYDROCHLORIDE 50 MG/1
50 TABLET ORAL EVERY 12 HOURS PRN
Status: DISCONTINUED | OUTPATIENT
Start: 2023-08-16 | End: 2023-08-17

## 2023-08-16 NOTE — CM/SW NOTE
Department  notified of request for bhavna FRANKLIN referrals started. Assigned CM/SW to follow up with pt/family on further discharge planning.      Georgia Covarrubias   August 16, 2023   08:53 \

## 2023-08-16 NOTE — PLAN OF CARE
Patient A/O x3, can be forget at times. Neuro checks q4. On room air. Vital signs as charted. Poor appetite. Ambulate to bathroom with 2 assist. PT/OT to evaluate. Call light within reach. Bed in low and locked position, chair alarm activated. Hourly rounds complete. Problem: NEUROLOGICAL - ADULT  Goal: Achieves stable or improved neurological status  Description: INTERVENTIONS  - Assess for and report changes in neurological status  - Initiate measures to prevent increased intracranial pressure  - Maintain blood pressure and fluid volume within ordered parameters to optimize cerebral perfusion and minimize risk of hemorrhage  - Monitor temperature, glucose, and sodium. Initiate appropriate interventions as ordered  Outcome: Progressing     Problem: Impaired Swallowing  Goal: Minimize aspiration risk  Description: Interventions:  - Patient should be alert and upright for all feedings (90 degrees preferred)  - Offer food and liquids at a slow rate  - No straws  - Encourage small bites of food and small sips of liquid  - Offer pills one at a time, crush or deliver with applesauce as needed  - Discontinue feeding and notify MD (or speech pathologist) if coughing or persistent throat clearing or wet/gurgly vocal quality is noted  Outcome: Progressing     Problem: Impaired Cognition  Goal: Patient will exhibit improved attention, thought processing and/or memory  Description: Interventions:  - Allow additional time for processing after asking questions or providing instructions  - Provide stimulation to the neglected side by encouraging family to sit/stand on the inattentive side during conversation  - Encourage use of hearing aids  Outcome: Progressing     Problem: SAFETY ADULT - FALL  Goal: Free from fall injury  Description: INTERVENTIONS:  - Assess pt frequently for physical needs  - Identify cognitive and physical deficits and behaviors that affect risk of falls.   - [place] fall precautions as indicated by assessment.  - Educate pt/family on patient safety including physical limitations  - Instruct pt to call for assistance with activity based on assessment  - Modify environment to reduce risk of injury  - Provide assistive devices as appropriate  - Consider OT/PT consult to assist with strengthening/mobility  - Encourage toileting schedule  Outcome: Progressing

## 2023-08-16 NOTE — PLAN OF CARE
Patient is awake/aert ox3; forgetful on time and situation. Constantly moving self in bed side to side, needs frequent help with repositioning. Frequent reminders on calling for help to get oob to use bathroom. Bed alarm is on and hourly rounds. Safety measures continued, this am not wanting to eat. Iv fluids continued and will encourage to eat. Neuro checks are on going as ordered. Left side deficit is noticeable and needs 2 asst to get oob and to ambulate with use of walker. Can not be left alone at risk for fall. Problem: CARDIOVASCULAR - ADULT  Goal: Maintains optimal cardiac output and hemodynamic stability  Description: INTERVENTIONS:  - Monitor vital signs, rhythm, and trends  - Monitor for bleeding, hypotension and signs of decreased cardiac output  - Evaluate effectiveness of vasoactive medications to optimize hemodynamic stability  - Monitor arterial and/or venous puncture sites for bleeding and/or hematoma  - Assess quality of pulses, skin color and temperature  - Assess for signs of decreased coronary artery perfusion - ex. Angina  - Evaluate fluid balance, assess for edema, trend weights  Outcome: Progressing     Problem: NEUROLOGICAL - ADULT  Goal: Achieves stable or improved neurological status  Description: INTERVENTIONS  - Assess for and report changes in neurological status  - Initiate measures to prevent increased intracranial pressure  - Maintain blood pressure and fluid volume within ordered parameters to optimize cerebral perfusion and minimize risk of hemorrhage  - Monitor temperature, glucose, and sodium.  Initiate appropriate interventions as ordered  Outcome: Not Progressing  Goal: Achieves maximal functionality and self care  Description: INTERVENTIONS  - Monitor swallowing and airway patency with patient fatigue and changes in neurological status  - Encourage and assist patient to increase activity and self care with guidance from PT/OT  - Encourage visually impaired, hearing impaired and aphasic patients to use assistive/communication devices  Outcome: Not Progressing     Problem: Impaired Cognition  Goal: Patient will exhibit improved attention, thought processing and/or memory  Description: Interventions:  Outcome: Not Progressing     Problem: Impaired Communication  Goal: Patient will achieve maximal communication potential  Description: Interventions:    Outcome: Not Progressing     Problem: SAFETY ADULT - FALL  Goal: Free from fall injury  Description: INTERVENTIONS:  - Assess pt frequently for physical needs  - Identify cognitive and physical deficits and behaviors that affect risk of falls.   - [place] fall precautions as indicated by assessment.  - Educate pt/family on patient safety including physical limitations  - Instruct pt to call for assistance with activity based on assessment  - Modify environment to reduce risk of injury  - Provide assistive devices as appropriate  - Consider OT/PT consult to assist with strengthening/mobility  - Encourage toileting schedule  Outcome: Not Progressing

## 2023-08-16 NOTE — CM/SW NOTE
OG spoke with the pt. And provided her with the accepting rehab options. The pt. Chose [facility]. [facility] has been reserved in [place]. MJ has submitted for insurance auth. OG left a message for the pt's dtr. Reva Chacon with the above information. Plan: [facility] when medically cleared and insurance auth received.      Rosie Dowling, [facility] ext [phone]

## 2023-08-16 NOTE — PLAN OF CARE
Patient weaned off cardene. PT/OT/ST saw patient. Patient up to chair and in bathroom. Patient still impulsive. Neuro Q4hr. Problem: CARDIOVASCULAR - ADULT  Goal: Maintains optimal cardiac output and hemodynamic stability  Description: INTERVENTIONS:  - Monitor vital signs, rhythm, and trends  - Monitor for bleeding, hypotension and signs of decreased cardiac output  - Evaluate effectiveness of vasoactive medications to optimize hemodynamic stability  - Monitor arterial and/or venous puncture sites for bleeding and/or hematoma  - Assess quality of pulses, skin color and temperature  - Assess for signs of decreased coronary artery perfusion - ex. Angina  - Evaluate fluid balance, assess for edema, trend weights  Outcome: Progressing     Problem: NEUROLOGICAL - ADULT  Goal: Achieves stable or improved neurological status  Description: INTERVENTIONS  - Assess for and report changes in neurological status  - Initiate measures to prevent increased intracranial pressure  - Maintain blood pressure and fluid volume within ordered parameters to optimize cerebral perfusion and minimize risk of hemorrhage  - Monitor temperature, glucose, and sodium.  Initiate appropriate interventions as ordered  Outcome: Progressing  Goal: Achieves maximal functionality and self care  Description: INTERVENTIONS  - Monitor swallowing and airway patency with patient fatigue and changes in neurological status  - Encourage and assist patient to increase activity and self care with guidance from PT/OT  - Encourage visually impaired, hearing impaired and aphasic patients to use assistive/communication devices  Outcome: Progressing

## 2023-08-17 VITALS
BODY MASS INDEX: 28.26 KG/M2 | OXYGEN SATURATION: 93 % | TEMPERATURE: 98 F | HEART RATE: 58 BPM | HEIGHT: 64.5 IN | DIASTOLIC BLOOD PRESSURE: 52 MMHG | WEIGHT: 167.56 LBS | RESPIRATION RATE: 16 BRPM | SYSTOLIC BLOOD PRESSURE: 115 MMHG

## 2023-08-17 LAB
ANION GAP SERPL CALC-SCNC: 4 MMOL/L (ref 0–18)
BASOPHILS # BLD AUTO: 0.05 X10(3) UL (ref 0–0.2)
BASOPHILS NFR BLD AUTO: 0.7 %
BUN BLD-MCNC: 20 MG/DL (ref 7–18)
BUN/CREAT SERPL: 33.3 (ref 10–20)
CALCIUM BLD-MCNC: 8.6 MG/DL (ref 8.5–10.1)
CHLORIDE SERPL-SCNC: 111 MMOL/L (ref 98–112)
CO2 SERPL-SCNC: 24 MMOL/L (ref 21–32)
CREAT BLD-MCNC: 0.6 MG/DL
DEPRECATED RDW RBC AUTO: 42.6 FL (ref 35.1–46.3)
EGFRCR SERPLBLD CKD-EPI 2021: 93 ML/MIN/1.73M2 (ref 60–?)
EOSINOPHIL # BLD AUTO: 0.1 X10(3) UL (ref 0–0.7)
EOSINOPHIL NFR BLD AUTO: 1.4 %
ERYTHROCYTE [DISTWIDTH] IN BLOOD BY AUTOMATED COUNT: 12.9 % (ref 11–15)
GLUCOSE BLD-MCNC: 97 MG/DL (ref 70–99)
GLUCOSE BLDC GLUCOMTR-MCNC: 113 MG/DL (ref 70–99)
GLUCOSE BLDC GLUCOMTR-MCNC: 128 MG/DL (ref 70–99)
HCT VFR BLD AUTO: 38.7 %
HGB BLD-MCNC: 13 G/DL
IMM GRANULOCYTES # BLD AUTO: 0.02 X10(3) UL (ref 0–1)
IMM GRANULOCYTES NFR BLD: 0.3 %
LYMPHOCYTES # BLD AUTO: 1.23 X10(3) UL (ref 1–4)
LYMPHOCYTES NFR BLD AUTO: 17.8 %
MCH RBC QN AUTO: 30.2 PG (ref 26–34)
MCHC RBC AUTO-ENTMCNC: 33.6 G/DL (ref 31–37)
MCV RBC AUTO: 89.8 FL
MONOCYTES # BLD AUTO: 0.95 X10(3) UL (ref 0.1–1)
MONOCYTES NFR BLD AUTO: 13.7 %
NEUTROPHILS # BLD AUTO: 4.57 X10 (3) UL (ref 1.5–7.7)
NEUTROPHILS # BLD AUTO: 4.57 X10(3) UL (ref 1.5–7.7)
NEUTROPHILS NFR BLD AUTO: 66.1 %
OSMOLALITY SERPL CALC.SUM OF ELEC: 291 MOSM/KG (ref 275–295)
PLATELET # BLD AUTO: 251 10(3)UL (ref 150–450)
POTASSIUM SERPL-SCNC: 4.1 MMOL/L (ref 3.5–5.1)
RBC # BLD AUTO: 4.31 X10(6)UL
SODIUM SERPL-SCNC: 139 MMOL/L (ref 136–145)
WBC # BLD AUTO: 6.9 X10(3) UL (ref 4–11)

## 2023-08-17 RX ORDER — AMLODIPINE BESYLATE 5 MG/1
5 TABLET ORAL DAILY
Refills: 0 | Status: SHIPPED | COMMUNITY
Start: 2023-08-18

## 2023-08-17 RX ORDER — LEVETIRACETAM 500 MG/1
500 TABLET ORAL 2 TIMES DAILY
Refills: 0 | Status: SHIPPED | COMMUNITY
Start: 2023-08-17

## 2023-08-17 RX ORDER — CALCIUM CARBONATE 500 MG/1
1000 TABLET, CHEWABLE ORAL 3 TIMES DAILY PRN
Status: DISCONTINUED | OUTPATIENT
Start: 2023-08-17 | End: 2023-08-17

## 2023-08-17 RX ORDER — LEVETIRACETAM 500 MG/1
500 TABLET ORAL 2 TIMES DAILY
Status: DISCONTINUED | OUTPATIENT
Start: 2023-08-17 | End: 2023-08-17

## 2023-08-17 RX ORDER — ALENDRONATE SODIUM 35 MG/1
35 TABLET ORAL
Status: SHIPPED | COMMUNITY
Start: 2023-08-17

## 2023-08-17 RX ORDER — PANTOPRAZOLE SODIUM 40 MG/1
40 TABLET, DELAYED RELEASE ORAL
Status: DISCONTINUED | OUTPATIENT
Start: 2023-08-18 | End: 2023-08-17

## 2023-08-17 RX ORDER — TRAMADOL HYDROCHLORIDE 50 MG/1
50 TABLET ORAL EVERY 12 HOURS PRN
Qty: 30 TABLET | Refills: 0 | Status: SHIPPED | OUTPATIENT
Start: 2023-08-17

## 2023-08-17 RX ORDER — ACETAMINOPHEN 325 MG/1
650 TABLET ORAL 3 TIMES DAILY
Status: SHIPPED | COMMUNITY
Start: 2023-08-17

## 2023-08-17 NOTE — PROGRESS NOTES
The patient is being discharged to [facility] in [facility]. The patients daughter,  Sixto Jaquez  requested resources for the future stating mom is up and down with alcohol. The following resources are offered. [place] [facility] (Medicare Friendly)  Online IOP  Sofía  [facility]   [phone]    Website:  [employer]    [employer]  (Search Smart Recovery)  Watch the Tips & Tools for Recovery    [employer]  [phone]

## 2023-08-17 NOTE — SLP NOTE
SPEECH DAILY NOTE - INPATIENT    ASSESSMENT & PLAN   ASSESSMENT    Proper PPE worn. Hands sanitized upon entrance/exit Pt room. Pt alert, afebrile and on room air. Pt seen for swallow analysis per BSE recommendations (after consulting with RN). Dtr present. Pt agreed to participate. Pt leaning to (L) in bed despite repositionings. Pt seen with current diet of minced & moist/mildly thick liquids for monitoring diet tolerance. Additionally, Pt seen with trials of thin liquids for candidacy of upgrade of diet. Swallowing precautions/strategies discussed; .mild cues needed for execution. Slow mastication and lingual preparation on minced & moist trials. Minimal oral retention cleared with cued liquid wash. Pharyngeal response appeared slightly delayed per hyolaryngeal elevation to completion (functional rise/strength per palpation). No overt clinical signs of aspiration on swallows of minced & moist nor mildly thick liquids (no coughing, no throat clearing, clear vocal quality and no SOB). Delayed cough 2 of 3 trials controlled thin liquids via open cup. No diet upgrade at this time. Collaborated with RN regarding Pt's swallowing plan of care. No report of difficulty taking meds. No CXR has been completed. Sp02 ~94% during this session. Call light within Pt's reach upon SLP discharge from room. PLAN:    To f/u x2 sessions to ensure safe intake of diet and reinforce swallowing/aspiration precautions. To monitor for any CXR results. Diet upgrade as tolerated. VFSS IF appropriate. Family education to be continued as available. Diet Recommendations - Solids: Mechanical soft ground/ Minced & Moist  Diet Recommendations - Liquids: Nectar thick liquids/ Mildly thick    Compensatory Strategies Recommended: No straws; Slow rate; Alternate consistencies;Small bites and sips  Aspiration Precautions: Upright position; Slow rate;Small bites and sips; No straw  Medication Administration Recommendations: Crushed in puree    Patient Experiencing Pain: No   Discharge Recommendations  Discharge Recommendations/Plan: Undetermined  Treatment Plan  Treatment Plan/Recommendations: Aspiration precautions  Interdisciplinary Communication: Discussed with RN    GOALS  Goal #1 The patient will tolerate puree consistency and mildly thick liquids without overt signs or symptoms of aspiration with 100 % accuracy over 1-2 session(s). Pt tolerates puree/mildly thick liquids with no overt signs/symptoms of aspiration. Pt upgraded to minced and moist solids. The patient will tolerate minced and moist consistency and mildly thick liquids without overt signs or symptoms of aspiration with 100 % accuracy over 1-2 session(s). No CSA on minced & moist nor mildly thick during this  session for 100% of trials. Increased OZZIE noted. Partially met   Goal #2 The patient/family/caregiver will demonstrate understanding and implementation of aspiration precautions and swallow strategies independently over 4 session(s). Reviewed swallowing precautions/strategies with Pt and dtr. Dtr v/u; reinforcement needed for Pt. Swallowing precautions written on board in room. In Progress   Goal #3 The patient will tolerate trial upgrade of soft solids/hard solids consistency and thin liquids without overt signs or symptoms of aspiration with 100 % accuracy over 3-4 session(s). Delayed cough 2 of 3 trials thin liquids controlled open cup. Increased OZZIE on minced & moist food. No diet upgrade at this time. Partially met   Goal #4 The patient will utilize compensatory strategies as outlined by  BSSE (clinical evaluation) including Slow rate, Small bites, Small sips, Alternate liquids/solids, No straws, Upright 90 degrees, Feed patient with 1:1 feed assistance 100 % of the time across 2 sessions. Pt in bed, self-fed oral trials. Swallowing precautions discussed/trained. Mild cues for alternating consistencies and controlled amounts. No straws used.  Pt would benefit from additional reinforcement of aspiration precautions.    .  In Progress   FOLLOW UP  Follow Up Needed (Documentation Required): Yes  SLP Follow-up Date: 08/18/23  Number of Visits to Meet Established Goals: 3-4    Session: 2    If you have any questions, please contact   [facility]  #[phone]

## 2023-08-17 NOTE — CM/SW NOTE
08/17/23 1053   Discharge disposition   Expected discharge disposition Rehab [address] Provider [facility]   Discharge transportation [address]     Pt discussed during nursing rounds. Pt is stable for dc today. MD ly order entered. Pt will dc to [facility] for acute rehab, liasoheila Goldstein confirmed ins [facility] has been obtained and bed is available today. Pt and daughter agreeable to transfer today and cost of transport which will be $85. [address] [facility] scheduled for 3pm . Number for nurse report is [phone]. Plan: [facility] for acute rehab today. / to remain available for support and/or discharge planning.      Barbra Fernandez, BSN    [phone]

## 2023-08-17 NOTE — PROGRESS NOTES
[facility] Pharmacy Note: Route Optimization for Levetiracetam (KEPPRA)    Patient is currently on Levetiracetam (KEPPRA) 500 mg IV every 12 hours. The patient meets the criteria to convert to the oral equivalent as established by the IV to Oral conversion protocol approved by the P&T committee. Medication was changed from IV formulation to Levetiracetam (KEPPRA)  500 mg PO every 12 hours per protocol.       Dsuty Owens, PharmD  8/17/2023,  9:34 AM

## 2023-08-17 NOTE — DISCHARGE PLANNING
Called to give report [phone] Gave my name and number, was told they would call back. Followed up again at [phone], was sent to voicemail.     Report given to [address] [place] [address] Road at [facility] [employer]

## 2023-08-17 NOTE — PROGRESS NOTES
[facility] Pharmacy Note: Route Optimization for Pantoprazole (Protonix)    Patient is currently on Pantoprazole (Protonix) 40 mg IV every 24 hours. The patient meets the criteria to convert to the oral equivalent as established by the IV to Oral conversion protocol approved by the P&T committee. Medication was changed from IV formulation to pantoprazole (Protonix)  40 mg PO every 24 hours per protocol.       Yolis Hill PharmD  8/17/2023,  9:32 AM

## 2023-08-17 NOTE — PLAN OF CARE
Patient is aox4; ra; contx2; 1 ast w walker. PRN tylenol given for moderate pain in R shoulder. Seen by SLP, recommended remain nectar thick liquids and minced diet. Cleared for discharge by attending, discharge education given. Dis[facility] to [facility]. Report given to [address] [place] [address] Road at [employer]. Problem: CARDIOVASCULAR - ADULT  Goal: Maintains optimal cardiac output and hemodynamic stability  Description: INTERVENTIONS:  - Monitor vital signs, rhythm, and trends  - Monitor for bleeding, hypotension and signs of decreased cardiac output  - Evaluate effectiveness of vasoactive medications to optimize hemodynamic stability  - Monitor arterial and/or venous puncture sites for bleeding and/or hematoma  - Assess quality of pulses, skin color and temperature  - Assess for signs of decreased coronary artery perfusion - ex. Angina  - Evaluate fluid balance, assess for edema, trend weights  Outcome: Completed     Problem: NEUROLOGICAL - ADULT  Goal: Achieves stable or improved neurological status  Description: INTERVENTIONS  - Assess for and report changes in neurological status  - Initiate measures to prevent increased intracranial pressure  - Maintain blood pressure and fluid volume within ordered parameters to optimize cerebral perfusion and minimize risk of hemorrhage  - Monitor temperature, glucose, and sodium.  Initiate appropriate interventions as ordered  Outcome: Completed  Goal: Achieves maximal functionality and self care  Description: INTERVENTIONS  - Monitor swallowing and airway patency with patient fatigue and changes in neurological status  - Encourage and assist patient to increase activity and self care with guidance from PT/OT  - Encourage visually impaired, hearing impaired and aphasic patients to use assistive/communication devices  Outcome: Completed

## 2023-08-17 NOTE — DISCHARGE SUMMARY
General Medicine Discharge Summary     Patient ID:  Nghia Pandya  69year old  11/26/1945    Admit date: 8/13/2023    Discharge date and time: 8/17/23    Attending Physician: Layla Schwartz DO     Primary Care Physician: Chary Kay MD     Discharge Diagnoses: Altered mental status   Hemorrhagic CVA  HLD  CAD  Chronic right shoulder pain  COPD and tobacco history   EtOH abuse     Discharge Condition: stable    Disposition: 8/17/23    Hospital Course:    69year old female with PMHx significant for CAD on aspirin, HLD, COPD, Tobacco H/o, who presents with change in mental status. Admitted for acute hemorrhagic stroke. Altered mental status   Hemorrhagic CVA  -Presented with left-sided visual field defect, left arm weakness   -CT head with right temporal lobe hemorrhage 7 x 4 x 3 cm with mass effect  -patient on baby aspirin prior to admission, discontinue on discharge  -DDAVP given on admission   -weaned off nicardipine, now on norvasc 5mg daily, continue at discharge  -CTA done  -keppra 500mg BID  -PT/OT, speech, plan for Acute rehab at discharge     HLD - statin      CAD - no stent history - hold asa at discharge for now. Can consider resumption outpatient after stability noted in brain imaging     Chronic right shoulder pain - s/p steroid injection last week, lidocaine patch, add tramadol PRN. Schedule tylenol      COPD and tobacco history - 1PPD     EtOH abuse - 1.5L wine every day but has cut down      Consults: IP CONSULT TO NEUROSURGERY  IP CONSULT TO NEUROLOGY  IP CONSULT TO PULMONOLOGY  IP CONSULT TO SOCIAL WORK  IP CONSULT TO PHYSICAL MEDICINE REHAB    Patient instructions:      Discharge medications reconciled with current medication list     Current Discharge Medication List    START taking these medications    acetaminophen 325 MG Oral Tab  Take 2 tablets (650 mg total) by mouth in the morning, at noon, and at bedtime.     amLODIPine 5 MG Oral Tab  Take 1 tablet (5 mg total) by mouth daily. levETIRAcetam 500 MG Oral Tab  Take 1 tablet (500 mg total) by mouth 2 (two) times daily. lidocaine-menthol 4-1 % External Patch  Place 1 patch onto the skin daily. traMADol 50 MG Oral Tab  Take 1 tablet (50 mg total) by mouth every 12 (twelve) hours as needed (moderate to severe pain). CONTINUE these medications which have CHANGED    alendronate 35 MG Oral Tab  Take 1 tablet (35 mg total) by mouth every 7 days. TAKES ON MONDAYS      CONTINUE these medications which have NOT CHANGED    Biotin 5000 MCG Oral Chew Tab  Chew 5,000 mcg by mouth daily. Fiber Complete Oral Tab  Take 5 tablets by mouth daily. atorvastatin 40 MG Oral Tab  Take 1 tablet (40 mg total) by mouth daily. Vitamin D3, Cholecalciferol, 50 MCG (2000 UT) Oral Tab  Take 2,000 Units by mouth daily. B Complex Vitamins (VITAMIN B COMPLEX OR)  Take 1 tablet by mouth daily. Coenzyme Q10 (CO Q 10) 100 MG Oral Cap  Take 2 tablets by mouth daily. REFRESH LACRI-LUBE Ophthalmic Ointment  daily as needed. Glucosamine 500 MG Oral Cap  Take  by mouth.       STOP taking these medications    aspirin 81 MG Oral Tab EC    Code Status: Full Code    Exam on day of discharge:      08/17/23  0800   BP: 136/63   Pulse: 63   Resp: 16   Temp:    General: no acute distress  Heart: RRR  Lungs: clear bilaterally  Abdomen: nontender  Extremities: no pedal edema     Total time coordinating care for discharge: Greater than 30 minutes    Gabbie Reina DO

## 2023-08-17 NOTE — CM/SW NOTE
Received call from [facility] [address] - pt is medically cleared once Drake Sallies is received. SW spoke to liaison Eugene gomez/ Lu[facility] Prophet submitted yesterday and still pending. Aware pt is medically ready and will update SW if Drake Sallies is received. PLAN: [facility] - pending ins auth        SW/CM to remain available for support and/or discharge planning.          Juan Duarte, MSW, [address] [facility]

## 2023-08-17 NOTE — PLAN OF CARE
Patient alert and oriented. On room air. NIH done. Patient complaining of right shoulder pain; icyhot placed on right shoulder + warm blanket. Poor appetite. Up to the chair with front wheel walker and 1 assist; a little bit of dragging of the left foot noticed, Overall did well. Voiding in the toilet. Problem: CARDIOVASCULAR - ADULT  Goal: Maintains optimal cardiac output and hemodynamic stability  Description: INTERVENTIONS:  - Monitor vital signs, rhythm, and trends  - Monitor for bleeding, hypotension and signs of decreased cardiac output  - Evaluate effectiveness of vasoactive medications to optimize hemodynamic stability  - Monitor arterial and/or venous puncture sites for bleeding and/or hematoma  - Assess quality of pulses, skin color and temperature  - Assess for signs of decreased coronary artery perfusion - ex. Angina  - Evaluate fluid balance, assess for edema, trend weights  Outcome: Progressing     Problem: NEUROLOGICAL - ADULT  Goal: Achieves stable or improved neurological status  Description: INTERVENTIONS  - Assess for and report changes in neurological status  - Initiate measures to prevent increased intracranial pressure  - Maintain blood pressure and fluid volume within ordered parameters to optimize cerebral perfusion and minimize risk of hemorrhage  - Monitor temperature, glucose, and sodium.  Initiate appropriate interventions as ordered  Outcome: Progressing  Goal: Achieves maximal functionality and self care  Description: INTERVENTIONS  - Monitor swallowing and airway patency with patient fatigue and changes in neurological status  - Encourage and assist patient to increase activity and self care with guidance from PT/OT  - Encourage visually impaired, hearing impaired and aphasic patients to use assistive/communication devices  Outcome: Progressing     Problem: Impaired Swallowing  Goal: Minimize aspiration risk  Description: Interventions:  - Patient should be alert and upright for all feedings (90 degrees preferred)  - Offer food and liquids at a slow rate  - No straws  - Encourage small bites of food and small sips of liquid  - Offer pills one at a time, crush or deliver with applesauce as needed  - Discontinue feeding and notify MD (or speech pathologist) if coughing or persistent throat clearing or wet/gurgly vocal quality is noted  Outcome: Progressing     Problem: SAFETY ADULT - FALL  Goal: Free from fall injury  Description: INTERVENTIONS:  - Assess pt frequently for physical needs  - Identify cognitive and physical deficits and behaviors that affect risk of falls.   - [place] fall precautions as indicated by assessment.  - Educate pt/family on patient safety including physical limitations  - Instruct pt to call for assistance with activity based on assessment  - Modify environment to reduce risk of injury  - Provide assistive devices as appropriate  - Consider OT/PT consult to assist with strengthening/mobility  - Encourage toileting schedule  Outcome: Progressing

## 2023-08-17 NOTE — DISCHARGE INSTRUCTIONS
8/17/23: The patient is being discharged to [facility] in [facility]. The patients daughter,  Celena Singh  requested resources for the future stating mom is up and down with alcohol. The following resources are offered. [place] [facility] (Medicare Friendly)  Online IOP  [facility]   [phone]    Website:  [employer]    [employer]  (Search Smart Recovery)  Watch the Tips & Tools for Recovery    [employer]  [phone]

## 2023-08-17 NOTE — PLAN OF CARE
Problem: CARDIOVASCULAR - ADULT  Goal: Maintains optimal cardiac output and hemodynamic stability  Description: INTERVENTIONS:  - Monitor vital signs, rhythm, and trends  - Monitor for bleeding, hypotension and signs of decreased cardiac output  - Evaluate effectiveness of vasoactive medications to optimize hemodynamic stability  - Monitor arterial and/or venous puncture sites for bleeding and/or hematoma  - Assess quality of pulses, skin color and temperature  - Assess for signs of decreased coronary artery perfusion - ex. Angina  - Evaluate fluid balance, assess for edema, trend weights  Outcome: Progressing     Problem: NEUROLOGICAL - ADULT  Goal: Achieves stable or improved neurological status  Description: INTERVENTIONS  - Assess for and report changes in neurological status  - Initiate measures to prevent increased intracranial pressure  - Maintain blood pressure and fluid volume within ordered parameters to optimize cerebral perfusion and minimize risk of hemorrhage  - Monitor temperature, glucose, and sodium. Initiate appropriate interventions as ordered  Outcome: Not Progressing  Goal: Achieves maximal functionality and self care  Description: INTERVENTIONS  - Monitor swallowing and airway patency with patient fatigue and changes in neurological status  - Encourage and assist patient to increase activity and self care with guidance from PT/OT  - Encourage visually impaired, hearing impaired and aphasic patients to use assistive/communication devices  Outcome: Not Progressing     Problem: Impaired Cognition  Goal: Patient will exhibit improved attention, thought processing and/or memory  Description: Interventions:    Problem: Patient Centered Care  Goal: Patient preferences are identified and integrated in the patient's plan of care  Description: Interventions:  - What would you like us to know as we care for you?  Patient lives at home with daughter  - Provide timely, complete, and accurate information to patient/family  - Incorporate patient and family knowledge, values, beliefs, and cultural backgrounds into the planning and delivery of care  - Encourage patient/family to participate in care and decision-making at the level they choose  - Honor patient and family perspectives and choices  Outcome: Progressing     Outcome: Not Progressing  Problem: Patient/Family Goals  Goal: Patient/Family Long Term Goal  Description: Patient's Long Term Goal: to return home at prior level of function    Interventions:  - stroke protocol  -monitor CT/ MRI  -PT/OT , SLP eval  -frequent neuro checks    - See additional Care Plan goals for specific interventions  Outcome: Progressing  Goal: Patient/Family Short Term Goal  Description: Patient's Short Term Goal: improved mental status  Interventions:   - frequent neuro checks  -monitor CT, MRI  -monitor labs    - See additional Care Plan goals for specific interventions  Outcome: Progressing        Problem: SAFETY ADULT - FALL  Goal: Free from fall injury  Description: INTERVENTIONS:  - Assess pt frequently for physical needs  - Identify cognitive and physical deficits and behaviors that affect risk of falls.   - [place] fall precautions as indicated by assessment.  - Educate pt/family on patient safety including physical limitations  - Instruct pt to call for assistance with activity based on assessment  - Modify environment to reduce risk of injury  - Provide assistive devices as appropriate  - Consider OT/PT consult to assist with strengthening/mobility  - Encourage toileting schedule  Outcome: Not Progressing

## 2023-08-18 NOTE — PAYOR COMM NOTE
--------------  DISCHARGE REVIEW    Payor: [facility] MEDICARE ADV PPO  Subscriber #:  [id number]  Authorization Number: [id number]    Admit date: 8/13/23  Admit time:   3:24 PM  Discharge Date: 8/17/2023  3:42 PM     Admitting Physician: Danelle Gupta MD  Attending Physician:  Evelyne att. providers found  Primary Care Physician: Sheryle Bank, MD          Discharge Summary Notes        Discharge Summary signed by Hola Richmond DO at 8/17/2023  4:39 PM       Author: Hola Richmond DO Specialty: HOSPITALIST Author Type: Physician    Filed: 8/17/2023  4:39 PM Date of Service: 8/17/2023 10:08 AM Status: Signed    : Hola Richmond DO (Physician)                                                              General Medicine Discharge Summary     Patient ID:  Natalia Lockhart  69year old  11/26/1945    Admit date: 8/13/2023    Discharge date and time: 8/17/23    Attending Physician: Hola Richmond DO     Primary Care Physician: Talon Veras MD     Discharge Diagnoses: Altered mental status   Hemorrhagic CVA  HLD  CAD  Chronic right shoulder pain  COPD and tobacco history   EtOH abuse     Discharge Condition: stable    Disposition: 8/17/23    Hospital Course:    69year old female with PMHx significant for CAD on aspirin, HLD, COPD, Tobacco H/o, who presents with change in mental status. Admitted for acute hemorrhagic stroke. Altered mental status   Hemorrhagic CVA  -Presented with left-sided visual field defect, left arm weakness   -CT head with right temporal lobe hemorrhage 7 x 4 x 3 cm with mass effect  -patient on baby aspirin prior to admission, discontinue on discharge  -DDAVP given on admission   -weaned off nicardipine, now on norvasc 5mg daily, continue at discharge  -CTA done  -keppra 500mg BID  -PT/OT, speech, plan for Acute rehab at discharge     HLD - statin      CAD - no stent history - hold asa at discharge for now.  Can consider resumption outpatient after stability noted in brain imaging     Chronic right shoulder pain - s/p steroid injection last week, lidocaine patch, add tramadol PRN. Schedule tylenol      COPD and tobacco history - 1PPD     EtOH abuse - 1.5L wine every day but has cut down      Consults: IP CONSULT TO NEUROSURGERY  IP CONSULT TO NEUROLOGY  IP CONSULT TO PULMONOLOGY  IP CONSULT TO SOCIAL WORK  IP CONSULT TO PHYSICAL MEDICINE REHAB    Patient instructions:      Discharge medications reconciled with current medication list     Current Discharge Medication List    START taking these medications    acetaminophen 325 MG Oral Tab  Take 2 tablets (650 mg total) by mouth in the morning, at noon, and at bedtime. amLODIPine 5 MG Oral Tab  Take 1 tablet (5 mg total) by mouth daily. levETIRAcetam 500 MG Oral Tab  Take 1 tablet (500 mg total) by mouth 2 (two) times daily. lidocaine-menthol 4-1 % External Patch  Place 1 patch onto the skin daily. traMADol 50 MG Oral Tab  Take 1 tablet (50 mg total) by mouth every 12 (twelve) hours as needed (moderate to severe pain). CONTINUE these medications which have CHANGED    alendronate 35 MG Oral Tab  Take 1 tablet (35 mg total) by mouth every 7 days. TAKES ON MONDAYS      CONTINUE these medications which have NOT CHANGED    Biotin 5000 MCG Oral Chew Tab  Chew 5,000 mcg by mouth daily. Fiber Complete Oral Tab  Take 5 tablets by mouth daily. atorvastatin 40 MG Oral Tab  Take 1 tablet (40 mg total) by mouth daily. Vitamin D3, Cholecalciferol, 50 MCG (2000 UT) Oral Tab  Take 2,000 Units by mouth daily. B Complex Vitamins (VITAMIN B COMPLEX OR)  Take 1 tablet by mouth daily. Coenzyme Q10 (CO Q 10) 100 MG Oral Cap  Take 2 tablets by mouth daily. REFRESH LACRI-LUBE Ophthalmic Ointment  daily as needed. Glucosamine 500 MG Oral Cap  Take  by mouth.       STOP taking these medications    aspirin 81 MG Oral Tab EC    Code Status: Full Code    Exam on day of discharge:      08/17/23  0800   BP: 136/63   Pulse: 63   Resp: 16   Temp: General: no acute distress  Heart: RRR  Lungs: clear bilaterally  Abdomen: nontender  Extremities: no pedal edema     Total time coordinating care for discharge: Greater than 30 minutes    Gabbie Reina DO      Electronically signed by Otilio Posadas DO on 8/17/2023  4:39 PM         REVIEWER COMMENTS

## 2023-09-11 ENCOUNTER — APPOINTMENT (OUTPATIENT)
Dept: MRI IMAGING | Facility: HOSPITAL | Age: 78
End: 2023-09-11
Attending: EMERGENCY MEDICINE
Payer: MEDICARE

## 2023-09-11 ENCOUNTER — APPOINTMENT (OUTPATIENT)
Dept: CT IMAGING | Facility: HOSPITAL | Age: 78
End: 2023-09-11
Attending: EMERGENCY MEDICINE
Payer: MEDICARE

## 2023-09-11 ENCOUNTER — HOSPITAL ENCOUNTER (EMERGENCY)
Facility: HOSPITAL | Age: 78
Discharge: HOME | End: 2023-09-11
Attending: EMERGENCY MEDICINE
Payer: MEDICARE

## 2023-09-11 ENCOUNTER — APPOINTMENT (OUTPATIENT)
Dept: GENERAL RADIOLOGY | Facility: HOSPITAL | Age: 78
End: 2023-09-11
Attending: EMERGENCY MEDICINE
Payer: MEDICARE

## 2023-09-11 VITALS
RESPIRATION RATE: 20 BRPM | OXYGEN SATURATION: 93 % | HEART RATE: 66 BPM | WEIGHT: 162 LBS | SYSTOLIC BLOOD PRESSURE: 120 MMHG | HEIGHT: 64 IN | BODY MASS INDEX: 27.66 KG/M2 | DIASTOLIC BLOOD PRESSURE: 66 MMHG | TEMPERATURE: 97 F

## 2023-09-11 DIAGNOSIS — R55 SYNCOPE, NEAR: Primary | ICD-10-CM

## 2023-09-11 DIAGNOSIS — U07.1 COVID-19: ICD-10-CM

## 2023-09-11 LAB
ALBUMIN SERPL-MCNC: 3.4 G/DL (ref 3.4–5)
ALBUMIN/GLOB SERPL: 0.9 {RATIO} (ref 1–2)
ALP LIVER SERPL-CCNC: 83 U/L
ALT SERPL-CCNC: 29 U/L
AMPHET UR QL SCN: NEGATIVE
ANION GAP SERPL CALC-SCNC: 10 MMOL/L (ref 0–18)
AST SERPL-CCNC: 23 U/L (ref 15–37)
ATRIAL RATE: 89 BPM
BARBITURATES UR QL SCN: NEGATIVE
BASOPHILS # BLD AUTO: 0.03 X10(3) UL (ref 0–0.2)
BASOPHILS NFR BLD AUTO: 0.3 %
BENZODIAZ UR QL SCN: NEGATIVE
BILIRUB SERPL-MCNC: 0.6 MG/DL (ref 0.1–2)
BILIRUB UR QL: NEGATIVE
BUN BLD-MCNC: 7 MG/DL (ref 7–18)
BUN/CREAT SERPL: 8.9 (ref 10–20)
CALCIUM BLD-MCNC: 9 MG/DL (ref 8.5–10.1)
CANNABINOIDS UR QL SCN: NEGATIVE
CHLORIDE SERPL-SCNC: 102 MMOL/L (ref 98–112)
CLARITY UR: CLEAR
CO2 SERPL-SCNC: 23 MMOL/L (ref 21–32)
COCAINE UR QL: NEGATIVE
COLOR UR: COLORLESS
CREAT BLD-MCNC: 0.79 MG/DL
CREAT UR-SCNC: 14.8 MG/DL
DEPRECATED RDW RBC AUTO: 39.8 FL (ref 35.1–46.3)
EGFRCR SERPLBLD CKD-EPI 2021: 77 ML/MIN/1.73M2 (ref 60–?)
EOSINOPHIL # BLD AUTO: 0.01 X10(3) UL (ref 0–0.7)
EOSINOPHIL NFR BLD AUTO: 0.1 %
ERYTHROCYTE [DISTWIDTH] IN BLOOD BY AUTOMATED COUNT: 12.2 % (ref 11–15)
ETHANOL SERPL-MCNC: <3 MG/DL (ref ?–3)
FLUAV + FLUBV RNA SPEC NAA+PROBE: NEGATIVE
FLUAV + FLUBV RNA SPEC NAA+PROBE: NEGATIVE
GLOBULIN PLAS-MCNC: 3.7 G/DL (ref 2.8–4.4)
GLUCOSE BLD-MCNC: 149 MG/DL (ref 70–99)
GLUCOSE BLDC GLUCOMTR-MCNC: 152 MG/DL (ref 70–99)
GLUCOSE UR-MCNC: NORMAL MG/DL
HCT VFR BLD AUTO: 42.4 %
HGB BLD-MCNC: 14.7 G/DL
HGB UR QL STRIP.AUTO: NEGATIVE
IMM GRANULOCYTES # BLD AUTO: 0.05 X10(3) UL (ref 0–1)
IMM GRANULOCYTES NFR BLD: 0.4 %
LEUKOCYTE ESTERASE UR QL STRIP.AUTO: NEGATIVE
LYMPHOCYTES # BLD AUTO: 0.86 X10(3) UL (ref 1–4)
LYMPHOCYTES NFR BLD AUTO: 7.2 %
MCH RBC QN AUTO: 30.6 PG (ref 26–34)
MCHC RBC AUTO-ENTMCNC: 34.7 G/DL (ref 31–37)
MCV RBC AUTO: 88.3 FL
MDMA UR QL SCN: NEGATIVE
METHADONE UR QL SCN: NEGATIVE
MONOCYTES # BLD AUTO: 1.14 X10(3) UL (ref 0.1–1)
MONOCYTES NFR BLD AUTO: 9.6 %
NEUTROPHILS # BLD AUTO: 9.84 X10 (3) UL (ref 1.5–7.7)
NEUTROPHILS # BLD AUTO: 9.84 X10(3) UL (ref 1.5–7.7)
NEUTROPHILS NFR BLD AUTO: 82.4 %
NITRITE UR QL STRIP.AUTO: NEGATIVE
OPIATES UR QL SCN: NEGATIVE
OSMOLALITY SERPL CALC.SUM OF ELEC: 281 MOSM/KG (ref 275–295)
OXYCODONE UR QL SCN: NEGATIVE
P AXIS: 39 DEGREES
P-R INTERVAL: 170 MS
PCP UR QL SCN: NEGATIVE
PH UR: 6 [PH] (ref 5–8)
PLATELET # BLD AUTO: 229 10(3)UL (ref 150–450)
POTASSIUM SERPL-SCNC: 3 MMOL/L (ref 3.5–5.1)
PROT SERPL-MCNC: 7.1 G/DL (ref 6.4–8.2)
PROT UR-MCNC: NEGATIVE MG/DL
Q-T INTERVAL: 370 MS
QRS DURATION: 70 MS
QTC CALCULATION (BEZET): 450 MS
R AXIS: -36 DEGREES
RBC # BLD AUTO: 4.8 X10(6)UL
RSV RNA SPEC NAA+PROBE: NEGATIVE
SARS-COV-2 RNA RESP QL NAA+PROBE: DETECTED
SODIUM SERPL-SCNC: 135 MMOL/L (ref 136–145)
SP GR UR STRIP: <1.005 (ref 1–1.03)
T AXIS: 42 DEGREES
TROPONIN I HIGH SENSITIVITY: 9 NG/L
UROBILINOGEN UR STRIP-ACNC: NORMAL
VENTRICULAR RATE: 89 BPM
WBC # BLD AUTO: 11.9 X10(3) UL (ref 4–11)

## 2023-09-11 PROCEDURE — 85025 COMPLETE CBC W/AUTO DIFF WBC: CPT | Performed by: EMERGENCY MEDICINE

## 2023-09-11 PROCEDURE — 93010 ELECTROCARDIOGRAM REPORT: CPT

## 2023-09-11 PROCEDURE — 70551 MRI BRAIN STEM W/O DYE: CPT | Performed by: EMERGENCY MEDICINE

## 2023-09-11 PROCEDURE — 80307 DRUG TEST PRSMV CHEM ANLYZR: CPT | Performed by: EMERGENCY MEDICINE

## 2023-09-11 PROCEDURE — 93005 ELECTROCARDIOGRAM TRACING: CPT

## 2023-09-11 PROCEDURE — 36415 COLL VENOUS BLD VENIPUNCTURE: CPT

## 2023-09-11 PROCEDURE — 82077 ASSAY SPEC XCP UR&BREATH IA: CPT | Performed by: EMERGENCY MEDICINE

## 2023-09-11 PROCEDURE — 99285 EMERGENCY DEPT VISIT HI MDM: CPT

## 2023-09-11 PROCEDURE — 82962 GLUCOSE BLOOD TEST: CPT

## 2023-09-11 PROCEDURE — 80053 COMPREHEN METABOLIC PANEL: CPT | Performed by: EMERGENCY MEDICINE

## 2023-09-11 PROCEDURE — 84484 ASSAY OF TROPONIN QUANT: CPT | Performed by: EMERGENCY MEDICINE

## 2023-09-11 PROCEDURE — 71045 X-RAY EXAM CHEST 1 VIEW: CPT | Performed by: EMERGENCY MEDICINE

## 2023-09-11 PROCEDURE — 0241U SARS-COV-2/FLU A AND B/RSV BY PCR (GENEXPERT): CPT | Performed by: EMERGENCY MEDICINE

## 2023-09-11 RX ORDER — POTASSIUM CHLORIDE 20 MEQ/1
40 TABLET, EXTENDED RELEASE ORAL ONCE
Status: COMPLETED | OUTPATIENT
Start: 2023-09-11 | End: 2023-09-11

## 2023-09-11 NOTE — ED QUICK NOTES
Per Pt's family pt did not fall, did not have any LOC. Pt did have an episode of staring and not responding with one eye closed, lasted 1-2 minutes. States has been coughing and cold symptoms. Recently a family member did have \"walking pneumonia\".

## 2023-09-11 NOTE — ED INITIAL ASSESSMENT (HPI)
Pt arrived per EMS from home. EMS states family found pt on bathroom floor with LOC x 1-2 minutes. Pt denies falling.  H/o ETOH and HTN

## 2023-09-11 NOTE — DISCHARGE INSTRUCTIONS
Do not take cough medicine as discussed. Primary physician. Return to the emergency department if fainting recurs or if difficulty breathing, repeated vomiting, or other new symptoms develop.

## 2023-09-12 NOTE — ED PROVIDER NOTES
Signout taken with disposition pending MRI/urinalysis -same without acute process, COVID positivity noted without hypoxia or radiographic chest findings. Family updated on results and comfortable with discharge plan of care including molnupirovir antiviral therapy in setting of multiple likely medication contraindications. Recent Labs   Lab 09/11/23  1845   COLORUR Colorless*   CLARITY Clear   SPECGRAVITY <1.005*   GLUUR Normal   BILUR Negative   KETUR Trace*   BLOODURINE Negative   PHURINE 6.0   PROUR Negative   UROBILINOGEN Normal   NITRITE Negative   LEUUR Negative     MRI BRAIN WO ACUTE (3) SEQUENCE (CPT=70551)    Result Date: 9/11/2023  PROCEDURE: MRI BRAIN WO ACUTE (3) SEQUENCE(CPT=70551)  COMPARISON: [facility], MRI BRAIN (XGM=44639), 8/13/2023, 4:44 PM.  [facility], CT BRAIN OR HEAD (CPT=70450), 8/14/2023, 6:07 AM.  INDICATIONS: syncope  TECHNIQUE: A variety of imaging planes and parameters were utilized for visualization of suspected pathology. Images were performed without contrast.  FINDINGS:   There is an again seen intraparenchymal hematoma centered in the right temporal lobe that measures approximately 7.7 x 3.1 cm, previously 7.8 x 3.1 cm when measured similarly on the prior head CT dated 08/14/2023. The hematoma demonstrates central areas  of T1 hypointensity with peripheral areas of T1 hyperintensity. The hematoma demonstrates a peripheral rim of susceptibility artifact. There is mild confluent surrounding FLAIR hyperintensity, which reflects vasogenic edema are to 08/13/2023. [address] There is mild local mass effect with effacement of multiple right cerebral hemisphere sulci as well as the right sylvian fissure, which has improved when compared to 08/13/2023. There is again seen mass effect with partial effacement of the right lateral ventricle and 3rd ventricle, which is most advanced involving the right temporal horn.   The left lateral ventricle is unchanged in size.  There is 4 mm right to left midline shift (7:14), previously measuring up to 7 mm on the prior head CT dated  08/14/2023. No new area of intracranial hemorrhage is identified on this study. No restricted diffusion to suggest acute ischemia/infarct. There are scattered foci and patchy areas of T2/FLAIR hyperintensity involving the periventricular and subcortical white matter as well as the keila, which is compatible with mild to moderate chronic microvascular ischemic changes. There is mild global parenchymal volume loss with prominence of the extra-axial spaces and ventricular system. No suspicious marrow replacing lesion the calvarium, skull base, or upper cervical spine. There is mild-to-moderate mucosal thickening involving the left sphenoid sinus and ethmoid sinuses with areas of aerosolized secretions. There is mild scattered paranasal sinus elsewhere. The mastoid air cells are well aerated. CONCLUSION:   1. Continued evolution of the late subacute to chronic intraparenchymal hematoma centered in the right temporal lobe. There has been mild improvement in the surrounding vasogenic edema and local mass effect as well as interval decrease in the now 4 mm right to left midline shift. 2. No new or worsening intracranial hemorrhage. 3. No acute infarct. 4. Unchanged mild-to-moderate chronic microvascular ischemic changes. 5. Mild-to-moderate paranasal sinus mucosal thickening with aerosolized secretions in the ethmoid and left sphenoid sinus, which can be seen acute sinusitis in the appropriate clinical setting. Dictated by (CST): Robert Dominguez MD on 9/11/2023 at 3:45 PM     Finalized by (CST): Robert Dominguez MD on 9/11/2023 at 4:00 PM      edema, hemorrhage, mass, acute infarction, or inappropriate atrophy. BRAINSTEM: No edema, hemorrhage, mass, acute infarction, or inappropriate atrophy. CSF SPACES: Effacement of the right lateral ventricle. 5 mm leftward midline shift. Remaining ventricles are normal in size and configuration. No extra-axial fluid collection. SKULL: No mass or other significant visible lesion. SINUSES: Limited views demonstrate no significant mucosal thickening or fluid. ORBITS: Limited views are unremarkable. OTHER: Negative. CONCLUSION: Limited exam due to presence of patient motion. No evidence of acute or subacute infarct. 7 x 4 x 4 cm hematoma is again seen within the right temporal lobe, causing localized mass effect and stable 5 mm leftward midline shift.    Dictated by (CST): Rosemarie Minaya MD on 8/13/2023 at 6:00 PM     Finalized by (CST): Rosemarie Minaya MD on 8/13/2023 at 6:03 PM

## 2023-09-12 NOTE — ED QUICK NOTES
Bedside report received. Awaiting lab specimen results. Will continue to monitor. Patient aware of positive covid status, isolation initiated and door closed.

## 2023-10-18 ENCOUNTER — OFFICE VISIT (OUTPATIENT)
Dept: NEUROLOGY | Facility: CLINIC | Age: 78
End: 2023-10-18
Payer: MEDICARE

## 2023-10-18 VITALS — WEIGHT: 162 LBS | HEIGHT: 64 IN | BODY MASS INDEX: 27.66 KG/M2

## 2023-10-18 DIAGNOSIS — I61.1 HEMORRHAGE OF RIGHT TEMPORAL LOBE (HCC): Primary | ICD-10-CM

## 2023-10-18 PROCEDURE — 1159F MED LIST DOCD IN RCRD: CPT | Performed by: OTHER

## 2023-10-18 PROCEDURE — 99214 OFFICE O/P EST MOD 30 MIN: CPT | Performed by: OTHER

## 2023-10-18 PROCEDURE — 1160F RVW MEDS BY RX/DR IN RCRD: CPT | Performed by: OTHER

## 2023-10-18 PROCEDURE — 1111F DSCHRG MED/CURRENT MED MERGE: CPT | Performed by: OTHER

## 2023-10-18 PROCEDURE — 3008F BODY MASS INDEX DOCD: CPT | Performed by: OTHER

## 2023-10-18 NOTE — PATIENT INSTRUCTIONS
Taper Keppra:   1. Take 2.5 ml every 12 hours for 4 days   2.  Take 2.5 ml daily for 4 days   Then stop

## 2023-12-17 ENCOUNTER — HOSPITAL ENCOUNTER (OUTPATIENT)
Dept: MRI IMAGING | Age: 78
Discharge: HOME | End: 2023-12-17
Attending: Other
Payer: MEDICARE

## 2023-12-17 ENCOUNTER — HOSPITAL ENCOUNTER (OUTPATIENT)
Dept: MRI IMAGING | Age: 78
End: 2023-12-17
Attending: Other
Payer: MEDICARE

## 2023-12-17 DIAGNOSIS — I61.1 HEMORRHAGE OF RIGHT TEMPORAL LOBE (HCC): ICD-10-CM

## 2023-12-17 PROCEDURE — A9575 INJ GADOTERATE MEGLUMI 0.1ML: HCPCS

## 2023-12-17 PROCEDURE — 70553 MRI BRAIN STEM W/O & W/DYE: CPT | Performed by: OTHER

## 2023-12-17 RX ORDER — GADOTERATE MEGLUMINE 376.9 MG/ML
15 INJECTION INTRAVENOUS
Status: COMPLETED | OUTPATIENT
Start: 2023-12-17 | End: 2023-12-17

## 2023-12-17 RX ADMIN — GADOTERATE MEGLUMINE 15 ML: 376.9 INJECTION INTRAVENOUS at 13:41:00

## 2023-12-26 DIAGNOSIS — I61.9 CEREBRAL BRAIN HEMORRHAGE (HCC): Primary | ICD-10-CM

## 2023-12-26 DIAGNOSIS — I63.9 ISCHEMIC STROKE (HCC): ICD-10-CM

## 2023-12-27 ENCOUNTER — PATIENT MESSAGE (OUTPATIENT)
Dept: NEUROLOGY | Facility: CLINIC | Age: 78
End: 2023-12-27

## 2023-12-28 NOTE — TELEPHONE ENCOUNTER
From: Iraj Delacruz  To: Jaimee Clayton  Sent: 12/27/2023 3:06 PM CST  Subject: CARD MONITOR 30 DAY CONTINUOUS TEL    Please tell me everything I need to know about this. Message just says to follow your directions. I have not received any instructions or direction.

## 2024-04-30 ENCOUNTER — TELEPHONE (OUTPATIENT)
Dept: NEUROLOGY | Facility: CLINIC | Age: 79
End: 2024-04-30

## 2024-04-30 NOTE — TELEPHONE ENCOUNTER
Phone call returned to pt daughter, Sridevi. Advised Sridevi that we are unable to disclose information to her about the pt. Sridevi advised that the pt despite being told she cannot drive is insisting that she is going back to driving. Sridevi would like to know what resources that she has available to her to assist with this issue.   Pt has a new neurologist, who is letting her do whatever she wants (according to the daughter).